# Patient Record
Sex: MALE | Race: WHITE | NOT HISPANIC OR LATINO | Employment: UNEMPLOYED | URBAN - METROPOLITAN AREA
[De-identification: names, ages, dates, MRNs, and addresses within clinical notes are randomized per-mention and may not be internally consistent; named-entity substitution may affect disease eponyms.]

---

## 2017-06-09 ENCOUNTER — OFFICE VISIT (OUTPATIENT)
Dept: BEHAVIORAL HEALTH | Facility: PHYSICIAN GROUP | Age: 22
End: 2017-06-09
Payer: COMMERCIAL

## 2017-06-09 DIAGNOSIS — F40.10 SOCIAL ANXIETY DISORDER: ICD-10-CM

## 2017-06-09 DIAGNOSIS — F34.1 DYSTHYMIC DISORDER: ICD-10-CM

## 2017-06-09 PROCEDURE — 90791 PSYCH DIAGNOSTIC EVALUATION: CPT | Performed by: PSYCHOLOGIST

## 2017-06-09 NOTE — MR AVS SNAPSHOT
Joshua Xiong   2017 2:00 PM   Appointment   MRN: [id number]    Department:  [facility]   Dept Phone:  [phone]    Description:  Male : 1995   Provider:  Brooklyn Kaye P.H.D.           Allergies as of 2017     Not on File      Vital Signs     Smoking Status                   Never Smoker            Basic Information     Date Of Birth Sex Race Ethnicity Preferred Language    1995 Male White Non- English      Your appointments     2017  2:00 PM   Initial Psychiatric Eval with Senthil Grigsby M.D.   [facility] [address] [place] [address] ([facility])    [address] [facility] [address] [facility] [address]  [address]  [place] [address]   [phone]            2017  2:00 PM   Individual Therapy with Brooklyn Kaye P.H.D.   [facility] [place] [place]    [address]  [address]  [place] [id number] [phone]            Aug 04, 2017  2:00 PM   Individual Therapy with Brooklyn Kaye P.H.D.   [facility] [place] [place]    [address]  [address]  [place] [id number] [phone]            Aug 18, 2017  2:00 PM   Individual Therapy with Brooklyn Kaye P.H.D.   [facility] [place] [place]    [address]  [address]  [place] [id number] [phone]            Aug 30, 2017  2:00 PM   Individual Therapy with Brooklyn Kaye P.H.D.   [facility] [place] [place]    [address]  [address]  [place] [address] [phone]              Health Maintenance     Patient has no pending health maintenance at this time      Current Immunizations     No immunizations on file.      Below and/or attached are the medications your provider expects you to take. Review all of your home medications and newly ordered medications with your provider and/or pharmacist. Follow medication instructions as directed by your provider and/or pharmacist. Please keep your medication list with you and share with your provider. Update the information when medications are discontinued, doses are  changed, or new medications (including over-the-counter products) are added; and carry medication information at all times in the event of emergency situations     Allergies:  No Known Allergies          Medications  Valid as of: June 09, 2017 -  2:42 PM    Generic Name Brand Name Tablet Size Instructions for use    Hydrocodone-Acetaminophen (Tab) NORCO 5-325 MG Take 1-2 Tabs by mouth every 6 hours as needed.        .                 Medicines prescribed today were sent to:     None      Medication refill instructions:       If your prescription bottle indicates you have medication refills left, it is not necessary to call your provider’s office. Please contact your pharmacy and they will refill your medication.    If your prescription bottle indicates you do not have any refills left, you may request refills at any time through one of the following ways: The online UZwan system (except Urgent Care), by calling your provider’s office, or by asking your pharmacy to contact your provider’s office with a refill request. Medication refills are processed only during regular business hours and may not be available until the next business day. Your provider may request additional information or to have a follow-up visit with you prior to refilling your medication.   *Please Note: Medication refills are assigned a new Rx number when refilled electronically. Your pharmacy may indicate that no refills were authorized even though a new prescription for the same medication is available at the pharmacy. Please request the medicine by name with the pharmacy before contacting your provider for a refill.           UZwan Access Code: [email or web address]  Expires: 7/9/2017  2:42 PM    UZwan  A secure, online tool to manage your health information     [facility] [place]’s UZwan® is a secure, online tool that connects you to your personalized health information from the privacy of your home -- day or night - making it very easy for  you to manage your healthcare. Once the activation process is completed, you can even access your medical information using the GlucoSentient winston, which is available for free in the Apple Winston store or Google Play store.     GlucoSentient provides the following levels of access (as shown below):   My Chart Features   [facility] Doctor [facility]  Specialists [facility]  Urgent  Care Non-[facility]  Primary Care  Doctor   Email your healthcare team securely and privately 24/7 X X X    Manage appointments: schedule your next appointment; view details of past/upcoming appointments X      Request prescription refills. X      View recent personal medical records, including lab and immunizations X X X X   View health record, including health history, allergies, medications X X X X   Read reports about your outpatient visits, procedures, consult and ER notes X X X X   See your discharge summary, which is a recap of your hospital and/or ER visit that includes your diagnosis, lab results, and care plan. X X       How to register for GlucoSentient:  1. Go to  [email or web address].  2. Click on the Sign Up Now box, which takes you to the New Member Sign Up page. You will need to provide the following information:  a. Enter your GlucoSentient Access Code exactly as it appears at the top of this page. (You will not need to use this code after you’ve completed the sign-up process. If you do not sign up before the expiration date, you must request a new code.)   b. Enter your date of birth.   c. Enter your home email address.   d. Click Submit, and follow the next screen’s instructions.  3. Create a GlucoSentient ID. This will be your GlucoSentient login ID and cannot be changed, so think of one that is secure and easy to remember.  4. Create a GlucoSentient password. You can change your password at any time.  5. Enter your Password Reset Question and Answer. This can be used at a later time if you forget your password.   6. Enter your e-mail address. This allows you to  receive e-mail notifications when new information is available in Vizimax.  7. Click Sign Up. You can now view your health information.    For assistance activating your Vizimax account, call [phone]

## 2017-06-13 ENCOUNTER — APPOINTMENT (OUTPATIENT)
Dept: OTHER | Facility: IMAGING CENTER | Age: 22
End: 2017-06-13

## 2017-06-13 PROCEDURE — 97530 THERAPEUTIC ACTIVITIES: CPT | Performed by: FAMILY MEDICINE

## 2017-06-14 PROBLEM — F40.10 SOCIAL ANXIETY DISORDER: Status: ACTIVE | Noted: 2017-06-14
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
## 2017-06-15 PROBLEM — F34.1 DYSTHYMIC DISORDER: Status: ACTIVE | Noted: 2017-06-15

## 2017-06-15 RX ORDER — ESCITALOPRAM OXALATE 10 MG/1
10 TABLET ORAL DAILY
COMMUNITY
End: 2017-07-11 | Stop reason: SINTOL

## 2017-06-26 ENCOUNTER — APPOINTMENT (OUTPATIENT)
Dept: OTHER | Facility: IMAGING CENTER | Age: 22
End: 2017-06-26

## 2017-06-26 PROCEDURE — 97530 THERAPEUTIC ACTIVITIES: CPT | Performed by: FAMILY MEDICINE

## 2017-06-29 ENCOUNTER — APPOINTMENT (OUTPATIENT)
Dept: OTHER | Facility: IMAGING CENTER | Age: 22
End: 2017-06-29

## 2017-06-29 PROCEDURE — 97530 THERAPEUTIC ACTIVITIES: CPT | Performed by: FAMILY MEDICINE

## 2017-07-06 ENCOUNTER — APPOINTMENT (OUTPATIENT)
Dept: OTHER | Facility: IMAGING CENTER | Age: 22
End: 2017-07-06

## 2017-07-06 PROCEDURE — 97530 THERAPEUTIC ACTIVITIES: CPT | Performed by: FAMILY MEDICINE

## 2017-07-10 ENCOUNTER — APPOINTMENT (OUTPATIENT)
Dept: OTHER | Facility: IMAGING CENTER | Age: 22
End: 2017-07-10

## 2017-07-10 PROCEDURE — 97530 THERAPEUTIC ACTIVITIES: CPT | Performed by: FAMILY MEDICINE

## 2017-07-11 ENCOUNTER — OFFICE VISIT (OUTPATIENT)
Dept: BEHAVIORAL HEALTH | Facility: PHYSICIAN GROUP | Age: 22
End: 2017-07-11
Payer: COMMERCIAL

## 2017-07-11 DIAGNOSIS — F33.9 EPISODE OF RECURRENT MAJOR DEPRESSIVE DISORDER, UNSPECIFIED DEPRESSION EPISODE SEVERITY (HCC): ICD-10-CM

## 2017-07-11 DIAGNOSIS — F40.10 SOCIAL ANXIETY DISORDER: ICD-10-CM

## 2017-07-11 PROCEDURE — 99203 OFFICE O/P NEW LOW 30 MIN: CPT | Performed by: STUDENT IN AN ORGANIZED HEALTH CARE EDUCATION/TRAINING PROGRAM

## 2017-07-11 RX ORDER — VENLAFAXINE HYDROCHLORIDE 37.5 MG/1
TABLET, EXTENDED RELEASE ORAL
Qty: 40 TAB | Refills: 0 | Status: SHIPPED | OUTPATIENT
Start: 2017-07-11 | End: 2017-07-14

## 2017-07-11 ASSESSMENT — PATIENT HEALTH QUESTIONNAIRE - PHQ9: CLINICAL INTERPRETATION OF PHQ2 SCORE: 0

## 2017-07-11 NOTE — MR AVS SNAPSHOT
Joshua Xiong   2017 2:00 PM   Office Visit   MRN: [id number]    Department:  Behavioral [facility]    Dept Phone:  [phone]    Description:  Male : 1995   Provider:  Senthil Grigsby M.D.           Allergies as of 2017     Not on File      Vital Signs     Smoking Status                   Light Tobacco Smoker           Basic Information     Date Of Birth Sex Race Ethnicity Preferred Language    1995 Male White Non- English      Your appointments     2017  2:00 PM   Follow Up Med Management with Senthil Grigsby M.D.   [facility] [address]    [address] [place] [address]  [address]  [place] [address]   [phone]            2017  2:00 PM   Individual Therapy with Brooklyn Kaye P.H.D.   [facility]    [address] [facility] [address]  [address]  [place] [id number] [phone]            Aug 04, 2017  2:00 PM   Individual Therapy with Brooklyn Kaye P.H.D.   [facility]    [address] [facility] [address]  [address]  [place] [id number] [phone]            Aug 18, 2017  2:00 PM   Individual Therapy with Brooklyn Kaye P.H.D.   [facility]    [address] [facility]  [address]  [place] [id number] [phone]            Aug 30, 2017  2:00 PM   Individual Therapy with Brooklyn Kaye P.H.D.   [facility]    [address] [facility] [address]  [address]  [place] [id number] [phone]              Problem List              ICD-10-CM Priority Class Noted - Resolved    Social anxiety disorder F40.10   2017 - Present    Dysthymic disorder F34.1   6/15/2017 - Present      Health Maintenance        Date Due Completion Dates    IMM HEP B VACCINE (1 of 3 - Primary Series) 1995 ---    IMM HEP A VACCINE (1 of 2 - Standard Series) 1998 ---    IMM HPV VACCINE (1 of 3 - Male 3 Dose Series) 2008 ---    IMM VARICELLA (CHICKENPOX) VACCINE (1 of 2 - 2 Dose Adolescent Series) 2010 ---    IMM MENINGOCOCCAL VACCINE  (MCV4) (1 of 1) 1/29/2013 ---    IMM DTaP/Tdap/Td Vaccine (1 - Tdap) 1/29/2016 ---    IMM INFLUENZA (1) 9/1/2017 ---            Current Immunizations     No immunizations on file.      Below and/or attached are the medications your provider expects you to take. Review all of your home medications and newly ordered medications with your provider and/or pharmacist. Follow medication instructions as directed by your provider and/or pharmacist. Please keep your medication list with you and share with your provider. Update the information when medications are discontinued, doses are changed, or new medications (including over-the-counter products) are added; and carry medication information at all times in the event of emergency situations     Allergies:  No Known Allergies          Medications  Valid as of: July 11, 2017 -  3:20 PM    Generic Name Brand Name Tablet Size Instructions for use    Escitalopram Oxalate (Tab) LEXAPRO 10 MG Take 10 mg by mouth every day.        Hydrocodone-Acetaminophen (Tab) NORCO 5-325 MG Take 1-2 Tabs by mouth every 6 hours as needed.        .                 Medicines prescribed today were sent to:     None      Medication refill instructions:       If your prescription bottle indicates you have medication refills left, it is not necessary to call your provider’s office. Please contact your pharmacy and they will refill your medication.    If your prescription bottle indicates you do not have any refills left, you may request refills at any time through one of the following ways: The online Shutl system (except Urgent Care), by calling your provider’s office, or by asking your pharmacy to contact your provider’s office with a refill request. Medication refills are processed only during regular business hours and may not be available until the next business day. Your provider may request additional information or to have a follow-up visit with you prior to refilling your medication.      *Please Note: Medication refills are assigned a new Rx number when refilled electronically. Your pharmacy may indicate that no refills were authorized even though a new prescription for the same medication is available at the pharmacy. Please request the medicine by name with the pharmacy before contacting your provider for a refill.           StashMetrics Access Code: [email or web address]  Expires: 8/9/2017  2:53 PM    StashMetrics  A secure, online tool to manage your health information     [employer]’s StashMetrics® is a secure, online tool that connects you to your personalized health information from the privacy of your home -- day or night - making it very easy for you to manage your healthcare. Once the activation process is completed, you can even access your medical information using the StashMetrics winston, which is available for free in the Apple Winston store or Google Play store.     StashMetrics provides the following levels of access (as shown below):   My Chart Features   [facility] Primary [facility] Doctor [facility]  Specialists [facility]  Urgent  Care Non-[facility]  Primary Care  Doctor   Email your healthcare team securely and privately 24/7 X X X    Manage appointments: schedule your next appointment; view details of past/upcoming appointments X      Request prescription refills. X      View recent personal medical records, including lab and immunizations X X X X   View health record, including health history, allergies, medications X X X X   Read reports about your outpatient visits, procedures, consult and ER notes X X X X   See your discharge summary, which is a recap of your hospital and/or ER visit that includes your diagnosis, lab results, and care plan. X X       How to register for StashMetrics:  1. Go to  [email or web address].  2. Click on the Sign Up Now box, which takes you to the New Member Sign Up page. You will need to provide the following information:  a. Enter your StashMetrics Access Code exactly as it appears at the top of this  page. (You will not need to use this code after you’ve completed the sign-up process. If you do not sign up before the expiration date, you must request a new code.)   b. Enter your date of birth.   c. Enter your home email address.   d. Click Submit, and follow the next screen’s instructions.  3. Create a BeanStockd ID. This will be your BeanStockd login ID and cannot be changed, so think of one that is secure and easy to remember.  4. Create a RxVault.int password. You can change your password at any time.  5. Enter your Password Reset Question and Answer. This can be used at a later time if you forget your password.   6. Enter your e-mail address. This allows you to receive e-mail notifications when new information is available in BeanStockd.  7. Click Sign Up. You can now view your health information.    For assistance activating your BeanStockd account, call [phone]        Quit Tobacco Information     Do you want to quit using tobacco?    Quitting tobacco decreases risks of cancer, heart and lung disease, increases life expectancy, improves sense of taste and smell, and increases spending money, among other benefits.    If you are thinking about quitting, we can help.  • [facility] Quit Tobacco Program: 808.409.3437  o Program occurs weekly for four weeks and includes pharmacist consultation on products to support quitting smoking or chewing tobacco. A provider referral is needed for pharmacist consultation.  • Tobacco Users Help Hotline: 9-800-QUIT-NOW (128-1885) or https://[place].quitlogix.org/  o Free, confidential telephone and online coaching for [place] residents. Sessions are designed on a schedule that is convenient for you. Eligible clients receive free nicotine replacement therapy.  • Nationally: www.smokefree.gov  o Information and professional assistance to support both immediate and long-term needs as you become, and remain, a non-smoker. Smokefree.gov allows you to choose the help that best fits your needs.

## 2017-07-11 NOTE — PROGRESS NOTES
PSYCHIATRIC CONSULTATION:  Reason for Evaluation:   Anxiety/depression  Supervising Physician:   Dr. Bowen        Chief Complaint: New Evaluation for Anxiety and Depression    HPI:   Seen and evaluated in outpatient setting. Says he has been suffering from Social Anxiety for as long as he can remember, describes having overwhelming worry and self-consciousness during social situations amongst new people and close friends and describes feelings of uneasiness and apprehension. Says he has difficulty opening up to outsiders and takes long time to even open up to his close friends. Says that his anxiety significantly worsened over the last two years when he went to college in [place] playing [employer] baseball and describes this time and very demanding and overwhelming and became depressed and had to transfer one year later to [facility] where he again played baseball with worsening depression. Says that prior to transferring to [place] patient lost approximately 25 pounds in 'one season.' Says over the last two years he has been suffering from worsening depression and was prescribed Lexapro roughly two months ago.Says that after starting Lexapro 10mg PO Daily had adverse reaction with excessive changes in his mood with 'very highs and lows' and described his emotions as being ' all over the place' and ended up attempting suicide by walking into traffic X1. Says he felt depressed but did not have any intentions of walking into traffic before that moment and says 'it just happened.'   Patient was weaned off of Lexapro and is not currently taking any medications. Describes feelings of numbness and emptiness. Says after his suicidal attempt about 1 month ago he started having suicidal ideation for the next two weeks until he went on vacation with his parents about two weeks ago. Says his vacation with his family improved his mood and does not have suicidal ideation at this time.Says that  being around family brought him out of his low mood and he feels back to a dysthymic baseline.     Psychiatric Review of Systems:current symptoms as reported by pt.  Depression:  Poor sleep roughly 2 hours nigthly for 1 week, Loss of interest- does not find baseball exciting anymore, denies feelings of guilt, energy level is moderate to low most days, concentration is fair to poor , appetite is fair, denies psychomotor retardation/agitation at this time, denies suicidal ideation at this time. Says that this has been persistent over the last 2 years.  Vidhi: denies symptoms of vidhi  Anxiety/Panic Attacks: as stated in HPI. No hx of panic attack  PTSD symptom: denies  Psychosis:denies       Medical Review of Systems: as reported by pt. All systems reviewed. Only those found to be + are noted below. All others are negative.   Neurological:    TBIs: denies   SZs: denies   Strokes: denies  Other medical symptoms:   Thyroid: denies   Diabetes: denies   Cardiovascular disease: denies     Psychiatric Examination: observed phenomenon:  Vitals: There were no vitals taken for this visit.  Musculoskeletal: normal gait  Appearance: casual clothing, groomed appropriately with good eye contact  Thoughts: TP: linear, organized, logical TC: denies paranoia/delusions. Denies Ah/Vh. Says he wants help with his anxiety.  Speech: monotonous  Mood: 'fine right now'  Affect: restricted  SI/HI:   denies  Attention/Alertness: alert, able to answer questions appropriatly     Memory: no abnormal deficits observed  Orientation: to person, place, time, and situation intact  Fund of Knowledge:      Insight/Judgement into symptoms:   Neurological Testing:( ie clock, cube drawing, MMSE, MOCA,etc.)    Past Psychiatric Hx:   -Hx of Lexapro 10mg Daily (started about 2 months ago) for approximately 3 weeks, then weaned off over one week after patient had erratic changes in mood along with suicidal attempt 3 weeks after starting this medication.  Suicide attempt was roughly 1 month ago.   -Recently assessed by behavioral health by Brooklyn Kaye P.H.D., will be starting weekly therapy beginning July 28th, 2017.  -hx of self injurious behaviors- burning his shoulder with hot knife. Hx of 3-4 X's within one week about 1 month ago. Says that he felt numb and empty and wanted to 'feel something.' Describes having some relief by burning himself.        Family Psychiatric Hx:  Mother with hx of one depressive episode after parents passed away, says that she had to take antidepressants and was back to 'normal' 3 months later and did not require antidepressants anymore. First cousin in 20's with hx of severe anxiety and depression    Social Hx: Recently moved back home in April-May 2017, lives with mother father, older sister 22 y/o and younger brother 20 y/o.     Drug/Alcohol/Tobacco Hx:   Drugs: marijuana daily for 2 years 1/ounce week   Alcohol: socially 1X/week. No hx of blackout   Tobacco: Chew tabacco 1-2 cans/day for 2 years, stopped about 4 months ago.     Medical Hx: denies medical hx  Medical Conditions: denies    Allergies: Lexapro- increase in suicidal ideation  Medications none at this time  Labs: no labs ordered  ECG: none on file    Cranial Imaging: none on file      ASSESSMENT:   21 y/o male with no previous previous psychiatric hx, new evaluation for depression and anxiety. Patient appears to suffer from social anxiety vs generalized anxiety disorder for as long as he can remember along with hx of dysthymia progressing severe depression over the last 2 years with hx of one previous suicidal attempt likely due to adverse effect of Lexapro. Patient meets criteria for depression at this time and would benefit from both therapy for anxiety along with anti-depressant for his depression and anxiety. Due to hx of adverse reaction to SSRI, will start Venlafaxine (SNRI) 37.5mg for 4 days and increase to 75 mg thereafter with follow-up appointment on  07/24/17 . Will closely monitor patient and requested that he call our office if he has any side effects or abnormal changes in his mood while starting this new medication. Will consider titrating this medication appropriately.       #Major Depressive Disorder  #Social Anxiety Disorder   -r/o Generalized Anxiety Disorder  -r/o dysthymia       PLAN:  -Trial Venlafaxine SR 37.5mg PO QAM X 4 days, then increase to 75mg PO QAM thereafter.   -Continue psychotherapy in outpatient setting, next appointment on 7/28/17 with Brooklyn Terrell, PHD.  -f/u on Monday 7/24/17

## 2017-07-14 RX ORDER — QUETIAPINE FUMARATE 50 MG/1
TABLET, EXTENDED RELEASE ORAL
Qty: 30 TAB | Refills: 0 | Status: SHIPPED | OUTPATIENT
Start: 2017-07-14 | End: 2017-08-01 | Stop reason: SDUPTHER

## 2017-07-14 NOTE — PROGRESS NOTES
Medication Update:    ASSESSMENT/PLAN  #Major Depressive Disorder  #Social Anxiety Disorder    -r/o Generalized Anxiety Disorder  -r/o dysthymia     Patient unable to fill Venlafaxine HCL due to finances. I have changed this medication to Seroquel XR 50mg PO QHS X 4 days, then increase to 100mg PO QHS thereafter for depression. I have called patient on 7/14/17 and notified him of the change which he agreed, he will be able to  at his local pharmacy.       Senthil Grigsby MD  PSYCHIATRY PGY 3 [facility]

## 2017-07-24 ENCOUNTER — OFFICE VISIT (OUTPATIENT)
Dept: BEHAVIORAL HEALTH | Facility: PHYSICIAN GROUP | Age: 22
End: 2017-07-24
Payer: COMMERCIAL

## 2017-07-24 DIAGNOSIS — F33.9 EPISODE OF RECURRENT MAJOR DEPRESSIVE DISORDER, UNSPECIFIED DEPRESSION EPISODE SEVERITY (HCC): ICD-10-CM

## 2017-07-24 DIAGNOSIS — F40.10 SOCIAL ANXIETY DISORDER: ICD-10-CM

## 2017-07-24 PROCEDURE — 99214 OFFICE O/P EST MOD 30 MIN: CPT | Performed by: STUDENT IN AN ORGANIZED HEALTH CARE EDUCATION/TRAINING PROGRAM

## 2017-07-24 RX ORDER — GABAPENTIN 300 MG/1
CAPSULE ORAL
Qty: 120 CAP | Refills: 1 | Status: SHIPPED | OUTPATIENT
Start: 2017-07-24 | End: 2017-08-25

## 2017-07-24 NOTE — PROGRESS NOTES
[facility]  PSYCHIATRIC FOLLOW-UP NOTE    Name: Joshua Xiong  MRN: [id number]  : 1995  Age: 22 y.o.  Date of assessment: 2017  PCP: Chuy Sellers M.D.  Persons in attendance: Patient  Total face-to-face time: 30 minutes    REASON FOR VISIT/CHIEF COMPLAINT (as stated by Patient):  Joshua Xiong is a 22 y.o., White male, attending follow-up appointment for Social Anxiety and Depression. Last seen by this provider on 17.        HISTORY OF PRESENT ILLNESS:    Seen and evaluated for f/u visit for anxiety and depression. Says that his mood has significantly improved and is not depressed anymore - mood was 2/10 previous admission and currently 7/10 today. Says that he is happier and observed to be smiling and conversing more appropriately today compared to previous visit (less lethargic), improved energy, improved concentration, does not display psychomotor retardation/lethargy, improvement in appetite. Says he is finally able to sleep but admits that he has difficulty with grogginess since starting Seroquel. Says he will experiment by taking Seroquel slightly early then 9PM in the evening to see if this would make a difference which is appropriate. Otherwise still complains of anxiety in the social environment and feeling on edge all the time which I have addressed with medication changes on this admission (below).  Denies feeling suicidal or homicidal and denies Ah/Vh at this time.    PSYCHOSOCIAL CHANGES SINCE PREVIOUS CONTACT:  Improved mood, more productive in art and other social avenues    RESPONSE TO TREATMENT:  Improved mood, says he is happier and feels almost back to normal    MEDICATION SIDE EFFECTS:  Lethargy in the morning that can last for upto 3 hours after waking up. Says he takes his medication around 9PM in the evening with full 8 hour sleep. Side effect of 'weird dreams' but says it is manageable and does not want to alter Seroquel dose as of yet.     REVIEW OF  SYSTEMS:        Constitutional negative   Eyes negative   Ears/Nose/Mouth/Throat negative   Cardiovascular negative   Respiratory negative   Gastrointestinal negative   Genitourinary negative   Muscular negative   Integumentary negative   Neurological negative   Endocrine negative   Hematologic/Lymphatic negative       PSYCHIATRIC EXAMINATION/MENTAL STATUS  There were no vitals taken for this visit.  Participation: Active verbal participation, appropriate answers  Grooming:Casual  Orientation: Alert  Eye contact: Good  Behavior:Calm   Mood: Euthymic, 'happier'  Affect: Flexible  Thought process: Logical  Thought content:  Within normal limits  Speech: Rate within normal limits (improved since last visit)  Perception:  Within normal limits  Memory:  No gross evidence of memory deficits  Insight: Good  Judgment: Good      Current risk:    Suicide: Low   Homicide: Low   Self-harm: Low  Relapse: Low  Other:   Crisis Safety Plan reviewed?Yes  If evidence of imminent risk is present, intervention/plan: call behavioral health clinic or crisis line if feeling suicidal/homicidal or significant changes to mental status.     Medical Records/Labs/Diagnostic Tests Reviewed: 5/19/2017 LABS:  Cholesterol 221 (H),  (H) otherwise CBC, CMP, and TSH within normal limits    Medical Records/Labs/Diagnostic Tests Ordered: none at this time.      ASSESSMENT:   21 y/o male with no previous previous psychiatric hx, f/u for depression and anxiety. Patient appears to suffer from social anxiety vs generalized anxiety disorder for as long as he can remember along with hx of dysthymia progressing severe depression over the last 2 years with hx of one previous suicidal attempt likely due to adverse effect of Lexapro. Patient meets criteria for depression  and would benefit from both therapy for anxiety along with mood stabilization for his depression and anxiety. Due to hx of adverse reaction to SSRI, and unable to fill Venlafaxine due to  cost of medication, trial Seroquel on 7/14/17 with significant improvement in mood/depression since starting medication. Says that his anxiety has not improved as of yet and will trial gabapentin 1200mg/daily, also beneficial for nicotine addiction.     #Major Depressive Disorder  #Social Anxiety Disorder    -r/o Generalized Anxiety Disorder  -r/o dysthymia       PLAN:  -Continue Seroquel 100mg PO/daily for mood/depression.    -Trial Gabapentin 300mg PO BID X 3 days, then increase to 300mg TID X 3 days, then increase to 300mg QID and continue- for anxiety and nicotine cravings.   -Continue psychotherapy in outpatient setting, next appointment on 7/28/17 with Brooklyn Terrell, PHD.  -f/u 4 weeks     Senthil Grigsby M.D.

## 2017-07-28 ENCOUNTER — OFFICE VISIT (OUTPATIENT)
Dept: BEHAVIORAL HEALTH | Facility: PHYSICIAN GROUP | Age: 22
End: 2017-07-28
Payer: COMMERCIAL

## 2017-07-28 DIAGNOSIS — F31.81 BIPOLAR II DISORDER MAJOR DEPRESSIVE WITH ATYPICAL FEATURES (HCC): ICD-10-CM

## 2017-07-28 DIAGNOSIS — F40.10 SOCIAL ANXIETY DISORDER: ICD-10-CM

## 2017-07-28 PROBLEM — F39 MOOD DISORDER (HCC): Status: ACTIVE | Noted: 2017-06-15

## 2017-07-28 PROCEDURE — 90834 PSYTX W PT 45 MINUTES: CPT | Performed by: PSYCHOLOGIST

## 2017-07-28 NOTE — BH THERAPY
[facility]  Therapy Progress Note    Patient Name: Joshua Xiong  Patient MRN: [id number]  Today's Date: 7/28/2017     Type of session:Individual psychotherapy  Length of session: 45 minutes  Persons in attendance:Patient    Subjective/New Info: Pt reports feeling more stable in mood and less socially anxious. Disc the medication changes and Pt's disc with MD about possible bipolar dx. Pt said he research it on line and felt the agitation, sleep problems, and irritability was not as attributed to his high anxiety as it was hypomania. Pt reports no SI, self harming, or social avoidance. He has accepted several invitations to go to social gatherings and has enjoyed himself. 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 Pt disc his family vacation and the different level of closeness he has with mom's side of family compared to dad's side. Pt has been soothing himself by painting. Shared his paintings and disc how it serves as a pleasant distraction for him. Will consult with Dr. Grigsby regarding changing primary dx to biploar II    Objective/Observations:   Participation: Active verbal participation, Attentive and Engaged   Grooming: Casual   Cognition: Alert and Fully Oriented   Eye contact: Good   Mood: Depressed   Affect: Constricted   Thought process: Logical and Goal-directed   Speech: Rate within normal limits   Other:     Diagnoses:   1. Social anxiety disorder    2. Mood disorder (CMS-HCC)         Current risk:   SUICIDE: Low   Homicide: Not applicable   Self-harm: Not applicable   Relapse: Not applicable   Other:    Safety Plan reviewed? Not Indicated   If evidence of imminent risk is present, intervention/plan:     Therapeutic Intervention(s): Conflict clarification, Establish rapport, Stressors assessed and Supportive psychotherapy    Treatment Goal(s)/Objective(s) addressed: Tx plan:  - Utilize learned skills to manage mood more effectively  - Learn to be  more emotionally flexible/resilient in times of stress/challenges  - Eliminate SI/self harming behaviors & increase sense of hope/optimisim   - Improve comfort with social/ interpersonal situations  - Learn to ameliorate effects of anxiety on life and functioning       Progress toward Treatment Goals: Moderate improvement    Plan:  - Continue Individual therapy and Medication management    Brooklyn Kaye, Ph.D.  7/28/2017

## 2017-08-01 RX ORDER — QUETIAPINE FUMARATE 50 MG/1
TABLET, EXTENDED RELEASE ORAL
Qty: 30 TAB | Refills: 0 | Status: SHIPPED | OUTPATIENT
Start: 2017-08-01 | End: 2017-08-18 | Stop reason: SDUPTHER

## 2017-08-04 ENCOUNTER — OFFICE VISIT (OUTPATIENT)
Dept: BEHAVIORAL HEALTH | Facility: PHYSICIAN GROUP | Age: 22
End: 2017-08-04
Payer: COMMERCIAL

## 2017-08-04 DIAGNOSIS — F31.81 BIPOLAR II DISORDER MAJOR DEPRESSIVE WITH ATYPICAL FEATURES (HCC): ICD-10-CM

## 2017-08-04 DIAGNOSIS — F40.10 SOCIAL ANXIETY DISORDER: ICD-10-CM

## 2017-08-04 PROCEDURE — 90834 PSYTX W PT 45 MINUTES: CPT | Performed by: PSYCHOLOGIST

## 2017-08-04 NOTE — BH THERAPY
[facility]  Therapy Progress Note    Patient Name: Joshua Xoing  Patient MRN: [id number]  Today's Date: 8/4/2017     Type of session:Individual psychotherapy  Length of session: 45 minutes  Persons in attendance:Patient    Subjective/New Info: Pt reports he is doing well. 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 Pt conts to go out and socialize with friends. Pt talked about some of the difficulties he has with his relationship with his father. Pt says home only feels calm and safe when his dad is away for long periods of time as a . When dad is home, Pt feels like he and his siblings have to walk on egg shells around his dad's explosive behavior. Pt feels much of his anxiety and inability to trust relationships stems from his interactions with his father who is very angry, demanding, controlling, withholding and black and white in his thinking. Pt is trying to get enough money to get a car and live near campus with friends. Disc how Pt could limit his interactions with his dad should have to spend a semester at home. Pt disc how his art and painting sets him free and relaxes his mind. Parents know about the dx, but dad doesn't get it and doesn't think it's a real thing.     Objective/Observations:   Participation: Active verbal participation, Attentive and Engaged   Grooming: Casual   Cognition: Alert and Fully Oriented   Eye contact: Good   Mood: Depressed   Affect: Constricted   Thought process: Logical and Goal-directed   Speech: Rate within normal limits   Other:     Diagnoses:   1. Bipolar II disorder major depressive with atypical features (CMS-HCC)    2. Social anxiety disorder         Current risk:   SUICIDE: Low   Homicide: Not applicable   Self-harm: Not applicable   Relapse: Not applicable   Other:    Safety Plan reviewed? Not Indicated   If evidence of imminent risk is present, intervention/plan:     Therapeutic  Intervention(s): Communication skills, Conflict clarification, Establish rapport, Problem-solving, Stressors assessed and Supportive psychotherapy    Treatment Goal(s)/Objective(s) addressed: Tx plan:  - Utilize learned skills to manage mood more effectively  - Learn to be more emotionally flexible/resilient in times of stress/challenges  - Eliminate SI/self harming behaviors & increase sense of hope/optimisim   - Improve comfort with social/ interpersonal situations  - Learn to ameliorate effects of anxiety on life and functioning       Progress toward Treatment Goals: Moderate improvement    Plan:  - Continue Individual therapy and Medication management    Brooklyn Kaye, Ph.D.  8/4/2017

## 2017-08-16 RX ORDER — QUETIAPINE FUMARATE 50 MG/1
TABLET, EXTENDED RELEASE ORAL
Qty: 30 TAB | Refills: 0 | Status: CANCELLED | OUTPATIENT
Start: 2017-08-16

## 2017-08-18 ENCOUNTER — OFFICE VISIT (OUTPATIENT)
Dept: BEHAVIORAL HEALTH | Facility: PHYSICIAN GROUP | Age: 22
End: 2017-08-18
Payer: COMMERCIAL

## 2017-08-18 DIAGNOSIS — F31.81 BIPOLAR II DISORDER MAJOR DEPRESSIVE WITH ATYPICAL FEATURES (HCC): ICD-10-CM

## 2017-08-18 DIAGNOSIS — F40.10 SOCIAL ANXIETY DISORDER: ICD-10-CM

## 2017-08-18 PROCEDURE — 90834 PSYTX W PT 45 MINUTES: CPT | Performed by: PSYCHOLOGIST

## 2017-08-18 RX ORDER — QUETIAPINE FUMARATE 50 MG/1
TABLET, EXTENDED RELEASE ORAL
Qty: 60 TAB | Refills: 1 | Status: SHIPPED | OUTPATIENT
Start: 2017-08-18 | End: 2017-10-03

## 2017-08-18 RX ORDER — QUETIAPINE FUMARATE 50 MG/1
TABLET, EXTENDED RELEASE ORAL
Qty: 60 TAB | Refills: 1 | Status: SHIPPED | OUTPATIENT
Start: 2017-08-18 | End: 2017-08-18 | Stop reason: SDUPTHER

## 2017-08-18 NOTE — BH THERAPY
" [facility]  Therapy Progress Note    Patient Name: Joshua Xiong  Patient MRN: [id number]  Today's Date: 8/18/2017     Type of session:Individual psychotherapy  Length of session: 45 minutes  Persons in attendance:Patient    Subjective/New Info: Pt reports he had a distressing three days when he couldn't refill his meds - no sleep for 3 days - is back on now. Disc imprtance of staying on top of his meds and refilling before running out. Pt reports that he is excited about school and nervous about the social demands he will face. Disc what \"outsider art\" is and talked about how important his art is to him and how he has decided to not take an art class because he thinks it will spoil the authentic spontaneity in his art. Psychoeducation and practice on utilization of mindfulness meditation to improve emotional regulation, cognition, impulse control, and problem solving.Email links on mindfulness meditation to Pt.     Objective/Observations:   Participation: Active verbal participation, Attentive and Engaged   Grooming: Casual   Cognition: Alert and Fully Oriented   Eye contact: Good   Mood: Depressed   Affect: Constricted   Thought process: Logical and Goal-directed   Speech: Rate within normal limits   Other:     Diagnoses:   1. Bipolar II disorder major depressive with atypical features (CMS-HCC)    2. Social anxiety disorder         Current risk:   SUICIDE: Low   Homicide: Not applicable   Self-harm: Not applicable   Relapse: Not applicable   Other:    Safety Plan reviewed? Not Indicated   If evidence of imminent risk is present, intervention/plan:     Therapeutic Intervention(s): Communication skills, Conflict clarification, Establish rapport, Problem-solving, Stressors assessed and Supportive psychotherapy    Treatment Goal(s)/Objective(s) addressed: Tx plan:  - Utilize learned skills to manage mood more effectively  - Learn to be more emotionally flexible/resilient in times of " stress/challenges  - Eliminate SI/self harming behaviors & increase sense of hope/optimisim   - Improve comfort with social/ interpersonal situations  - Learn to ameliorate effects of anxiety on life and functioning       Progress toward Treatment Goals: Mild improvement    Plan:  - Continue Individual therapy and Medication management    Brooklyn Kaye, Ph.D.  8/18/2017

## 2017-08-25 ENCOUNTER — OFFICE VISIT (OUTPATIENT)
Dept: BEHAVIORAL HEALTH | Facility: PHYSICIAN GROUP | Age: 22
End: 2017-08-25
Payer: COMMERCIAL

## 2017-08-25 DIAGNOSIS — F40.10 SOCIAL ANXIETY DISORDER: ICD-10-CM

## 2017-08-25 DIAGNOSIS — F31.81 BIPOLAR II DISORDER MAJOR DEPRESSIVE WITH ATYPICAL FEATURES (HCC): Primary | ICD-10-CM

## 2017-08-25 PROCEDURE — 99214 OFFICE O/P EST MOD 30 MIN: CPT | Performed by: STUDENT IN AN ORGANIZED HEALTH CARE EDUCATION/TRAINING PROGRAM

## 2017-08-25 RX ORDER — LITHIUM CARBONATE 300 MG
450 TABLET ORAL 2 TIMES DAILY
Qty: 45 TAB | Refills: 1 | Status: SHIPPED | OUTPATIENT
Start: 2017-08-25 | End: 2017-09-12 | Stop reason: SINTOL

## 2017-08-25 RX ORDER — HYDROXYZINE 50 MG/1
TABLET, FILM COATED ORAL
Qty: 60 TAB | Refills: 1 | Status: SHIPPED | OUTPATIENT
Start: 2017-08-25 | End: 2017-09-12

## 2017-08-25 NOTE — MR AVS SNAPSHOT
Joshua Xiong   2017 4:00 PM   Appointment   MRN: [id number]    Department:  [facility]   Dept Phone:  [phone]    Description:  Male : 1995   Provider:  Senthil Grigsby M.D.           Allergies as of 2017     No Known Allergies      Vital Signs     Smoking Status                   Light Tobacco Smoker           Basic Information     Date Of Birth Sex Race Ethnicity Preferred Language    1995 Male White Non- English      Your appointments     Aug 30, 2017  2:00 PM   Individual Therapy with Brooklyn Kaye, Ph.D.   [facility]    [address]  [address]  [place] [address]   [phone]            Sep 12, 2017  4:30 PM   Follow Up Med Management with Senthil Grigsby M.D.   [facility]    [address] [place] [address]  [address]  [place] [address]   [phone]            Oct 18, 2017 11:00 AM   Individual Therapy with Brooklyn Kaye, Ph.D.   [facility]    [address]  [address]  [place] [address]   [phone]            2017  5:00 PM   Individual Therapy with Brooklyn Kaye, Ph.D.   [facility]    [address]  [address]  [place] [address]   [phone]            Dec 05, 2017  2:00 PM   Individual Therapy with Brooklyn Kaye, Ph.D.   [facility]    [address]  [address]  [place] [address]   [phone]            Dec 19, 2017  3:00 PM   Individual Therapy with Brooklyn Kaye, Ph.D.   [facility]    [address]  [address]  [place] [address]   [phone]              Problem List              ICD-10-CM Priority Class Noted - Resolved    Social anxiety disorder F40.10   2017 - Present    Bipolar II disorder major depressive with atypical features (CMS-HCC) F31.81   6/15/2017 - Present    Episode of recurrent major depressive disorder (CMS-HCC) F33.9   2017 - Present      Health Maintenance        Date  Due Completion Dates    IMM HEP B VACCINE (1 of 3 - Primary Series) 1995 ---    IMM HEP A VACCINE (1 of 2 - Standard Series) 1/29/1998 ---    IMM HPV VACCINE (1 of 3 - Male 3 Dose Series) 1/29/2008 ---    IMM VARICELLA (CHICKENPOX) VACCINE (1 of 2 - 2 Dose Adolescent Series) 1/29/2010 ---    IMM MENINGOCOCCAL VACCINE (MCV4) (1 of 1) 1/29/2013 ---    IMM DTaP/Tdap/Td Vaccine (1 - Tdap) 1/29/2016 ---    IMM INFLUENZA (1) 9/1/2017 ---            Current Immunizations     No immunizations on file.      Below and/or attached are the medications your provider expects you to take. Review all of your home medications and newly ordered medications with your provider and/or pharmacist. Follow medication instructions as directed by your provider and/or pharmacist. Please keep your medication list with you and share with your provider. Update the information when medications are discontinued, doses are changed, or new medications (including over-the-counter products) are added; and carry medication information at all times in the event of emergency situations     Allergies:  No Known Allergies          Medications  Valid as of: August 25, 2017 -  4:32 PM    Generic Name Brand Name Tablet Size Instructions for use    Gabapentin (Cap) NEURONTIN 300 MG 1 capsule BID X 3 days, then increase to 1 capsule three TID X 3 days, then increase to 1 capsule QID continued for total of 1200mg/daily.        Hydrocodone-Acetaminophen (Tab) NORCO 5-325 MG Take 1-2 Tabs by mouth every 6 hours as needed.        QUEtiapine Fumarate (TABLET SR 24 HR) QUEtiapine Fumarate 50 MG Seroquel ER 100mg PO QHS (50mg X 2 tabs bedtime)        .                 Medicines prescribed today were sent to:     [id number] - [place] - [address]    [address] [address]    Phone: [phone] Fax: [phone]    Open 24 Hours?: No      Medication refill instructions:       If your prescription bottle indicates you have medication  refills left, it is not necessary to call your provider’s office. Please contact your pharmacy and they will refill your medication.    If your prescription bottle indicates you do not have any refills left, you may request refills at any time through one of the following ways: The online BitPass system (except Urgent Care), by calling your provider’s office, or by asking your pharmacy to contact your provider’s office with a refill request. Medication refills are processed only during regular business hours and may not be available until the next business day. Your provider may request additional information or to have a follow-up visit with you prior to refilling your medication.   *Please Note: Medication refills are assigned a new Rx number when refilled electronically. Your pharmacy may indicate that no refills were authorized even though a new prescription for the same medication is available at the pharmacy. Please request the medicine by name with the pharmacy before contacting your provider for a refill.           BitPass Access Code: [email or web address]  Expires: 9/24/2017  4:32 PM    BitPass  A secure, online tool to manage your health information     [employer]’s BitPass® is a secure, online tool that connects you to your personalized health information from the privacy of your home -- day or night - making it very easy for you to manage your healthcare. Once the activation process is completed, you can even access your medical information using the BitPass winston, which is available for free in the Apple Winston store or Google Play store.     BitPass provides the following levels of access (as shown below):   My Chart Features   [facility]  Specialists [facility]  Urgent  Care [facility] Care  Doctor   Email your healthcare team securely and privately 24/7 X X X    Manage appointments: schedule your next appointment; view details of past/upcoming appointments X      Request  prescription refills. X      View recent personal medical records, including lab and immunizations X X X X   View health record, including health history, allergies, medications X X X X   Read reports about your outpatient visits, procedures, consult and ER notes X X X X   See your discharge summary, which is a recap of your hospital and/or ER visit that includes your diagnosis, lab results, and care plan. X X       How to register for [employer]ite:  1. Go to  [email or web address].  2. Click on the Sign Up Now box, which takes you to the New Member Sign Up page. You will need to provide the following information:  a. Enter your Autosprite Access Code exactly as it appears at the top of this page. (You will not need to use this code after you’ve completed the sign-up process. If you do not sign up before the expiration date, you must request a new code.)   b. Enter your date of birth.   c. Enter your home email address.   d. Click Submit, and follow the next screen’s instructions.  3. Create a Autosprite ID. This will be your Autosprite login ID and cannot be changed, so think of one that is secure and easy to remember.  4. Create a Autosprite password. You can change your password at any time.  5. Enter your Password Reset Question and Answer. This can be used at a later time if you forget your password.   6. Enter your e-mail address. This allows you to receive e-mail notifications when new information is available in Autosprite.  7. Click Sign Up. You can now view your health information.    For assistance activating your Autosprite account, call [phone]        Quit Tobacco Information     Do you want to quit using tobacco?    Quitting tobacco decreases risks of cancer, heart and lung disease, increases life expectancy, improves sense of taste and smell, and increases spending money, among other benefits.    If you are thinking about quitting, we can help.  • [facility] Quit Tobacco Program: [phone]  o Program occurs  weekly for four weeks and includes pharmacist consultation on products to support quitting smoking or chewing tobacco. A provider referral is needed for pharmacist consultation.  • Tobacco Users Help Hotline: 7-800QUIT-NOW (713-9964) or https://[place].quitlogix.org/  o Free, confidential telephone and online coaching for [place] residents. Sessions are designed on a schedule that is convenient for you. Eligible clients receive free nicotine replacement therapy.  • Nationally: www.smokefree.gov  o Information and professional assistance to support both immediate and long-term needs as you become, and remain, a non-smoker. Smokefree.gov allows you to choose the help that best fits your needs.

## 2017-08-25 NOTE — PROGRESS NOTES
[facility]  PSYCHIATRIC FOLLOW-UP NOTE    Name: Joshua Xiong  MRN: [id number]  : 1995  Age: 22 y.o.  Date of assessment: 2017  PCP: Chuy Sellers M.D.  Persons in attendance: Patient  Total face-to-face time: 30 minutes    REASON FOR VISIT/CHIEF COMPLAINT (as stated by Patient):  Joshua Xiong is a 22 y.o., White male, attending follow-up appointment for Social Anxiety and mood instability. Last seen by this provider on 17.     CURRENT PSYCHOTROPIC MEDS:  -seroquel 100mg PO QHS for insomnia    HISTORY OF PRESENT ILLNESS:    Patient seen and evaluated in outpatient clinic this afternoon. Says that since his last visit his sleep has been much more regulated with seroquel which has been good. Admits this appointment that at times he has increased energy and racing thoughts when he has lack of sleep and the night before impulsively drove down to [place] in the middle of the night to watch the sunrise - described in a euphoric state. Admits that he ran out of his medication for 4 days and he was sleeping minimally and was able to stay up all night and draw 4 pictures that would have taken him much long otherwise.     PSYCHOSOCIAL CHANGES SINCE PREVIOUS CONTACT:  Starting school in  1 week  Recently applied for jobs at [employer] - says he has a good change of getting a part time position.    RESPONSE TO TREATMENT:  Able to sleep, very effective    MEDICATION SIDE EFFECTS:  None reported    REVIEW OF SYSTEMS:        Constitutional negative   Eyes negative   Ears/Nose/Mouth/Throat negative   Cardiovascular negative   Respiratory negative   Gastrointestinal negative   Genitourinary negative   Muscular negative   Integumentary negative   Neurological negative   Endocrine negative   Hematologic/Lymphatic negative       PSYCHIATRIC EXAMINATION/MENTAL STATUS  There were no vitals taken for this visit.  Participation: Active verbal participation  Grooming:Good and Casual  Orientation: Alert  Eye  contact: Good  Behavior:Calm   Mood: Euthymic  Affect: constricted/restricted  Thought process: Logical  Thought content:  Within normal limits  Speech: mildly slow  Perception:  Within normal limits  Memory:  No gross evidence of memory deficits  Insight: Good  Judgment: Good      Current risk:    Suicide: low/moderate : hx of 1 previous suicidal attempt (impulsive) - appears to have a mixed bipolar episode secondary to adverse reaction from SSRI   Homicide: Low   Self-harm: Low  Relapse: Low  Crisis Safety Plan reviewed?Yes  If evidence of imminent risk is present, intervention/plan: call crisis line or go straight to emergency room if suicidal/homicidal or significant changes to mental status.     Medical Records/Labs/Diagnostic Tests Reviewed: 5/19/2017 LABS:  Cholesterol 221 (H),  (H) otherwise CBC, CMP, and TSH within normal limits    Medical Records/Labs/Diagnostic Tests Ordered: none at this time.      ASSESSMENT:   21 y/o male with no previous previous psychiatric hx, f/u for mood instability and anxiety. Patient appears to suffer from social anxiety vs generalized anxiety disorder for as long as he can remember along with hx of depression and mood instability over the last 2 years with hx of one previous suicidal attempt likely due to adverse effect of Lexapro (appears to have resulted in Mixed bipolar state) . After further review and additional historical information, patient does meet criteria for bipolar disorder and would benefit from both therapy for anxiety along with mood stabiliizer.  Started low dose Seroquel on 7/14/17 with significant improvement in mood/depression. Says that his anxiety has not improved as of yet and will trial vistaril 25-50mg PO TID PRN.     #Bipolar II disorder, with mixed features  -r/o Bipolar 1 disorder, with mixed features  #Social Anxiety Disorder    -r/o Generalized Anxiety Disorder    -Cont Seroquel 100mg PO QHS for insomnia/mood   -Trial Lithium 450mg PO BID    -Lithium blood level measures after 5 days   -Trial Hydroxyzine 25-50mg PO TID PRN for anxiety   -f/u Sept 12th (Tuesday) - <3 weeks.     Senthil Grigsby M.D.

## 2017-08-30 ENCOUNTER — OFFICE VISIT (OUTPATIENT)
Dept: BEHAVIORAL HEALTH | Facility: PHYSICIAN GROUP | Age: 22
End: 2017-08-30
Payer: COMMERCIAL

## 2017-08-30 DIAGNOSIS — F31.81 BIPOLAR II DISORDER MAJOR DEPRESSIVE WITH ATYPICAL FEATURES (HCC): ICD-10-CM

## 2017-08-30 DIAGNOSIS — F40.10 SOCIAL ANXIETY DISORDER: ICD-10-CM

## 2017-08-30 PROCEDURE — 90834 PSYTX W PT 45 MINUTES: CPT | Performed by: PSYCHOLOGIST

## 2017-08-30 NOTE — BH THERAPY
" [facility]  Therapy Progress Note    Patient Name: Joshua Xiong  Patient MRN: [id number]  Today's Date: 8/30/2017     Type of session:Individual psychotherapy  Length of session: 45 minutes  Persons in attendance:Patient    Subjective/New Info: Pt reports he is not doing well. \"I feels really weird - feel like I'm on the verge of tears - either I can't sleep at all or I crash with sleep\". Left EPIC in-box mssg for Dr. Grigsby. Disc using mindfulness to hold space for difficult feeling/thinking experiences but not get too invested in them as representative of an \"absolute truth\". Pt reports he has been watching youtube videos of people with bipolar talking about the illness - he feels like he is \"screwed either way\" . Disc the process of finding the right meds, learning to care and keep balance in his life, and coming to a place of acceptance while directing energy toward cultivating a meaningful life with bipolar. Reminded Pt that when he watches those videos, he doesn't know what else might be going on in their lives or how they are managing their lives and self care. Pt is going to utilize the coping techniques for holding space for difficult emotions and see if he adjusts to the new meds. He knows to call in to Dr. Grigsby if things get worse.    Objective/Observations:   Participation: Active verbal participation, Attentive and Engaged   Grooming: Casual   Cognition: Alert and Fully Oriented   Eye contact: Good   Mood: Depressed and Anxious   Affect: Constricted   Thought process: Logical and Goal-directed   Speech: Rate within normal limits   Other:     Diagnoses:   1. Social anxiety disorder    2. Bipolar II disorder major depressive with atypical features (CMS-HCC)         Current risk:   SUICIDE: Low   Homicide: Not applicable   Self-harm: Not applicable   Relapse: Not applicable   Other:    Safety Plan reviewed? Not Indicated   If evidence of imminent risk is present, intervention/plan: "     Therapeutic Intervention(s): Communication skills, Conflict clarification, Establish rapport, Problem-solving, Stressors assessed and Supportive psychotherapy    Treatment Goal(s)/Objective(s) addressed: Tx plan:  - Utilize learned skills to manage mood more effectively  - Learn to be more emotionally flexible/resilient in times of stress/challenges  - Eliminate SI/self harming behaviors & increase sense of hope/optimisim   - Improve comfort with social/ interpersonal situations  - Learn to ameliorate effects of anxiety on life and functioning       Progress toward Treatment Goals: Mild decline    Plan:  - Continue Individual therapy and Medication management    Brooklyn Kaye, Ph.D.  8/30/2017

## 2017-08-31 ENCOUNTER — HOSPITAL ENCOUNTER (OUTPATIENT)
Dept: LAB | Facility: MEDICAL CENTER | Age: 22
End: 2017-08-31
Attending: STUDENT IN AN ORGANIZED HEALTH CARE EDUCATION/TRAINING PROGRAM
Payer: COMMERCIAL

## 2017-08-31 LAB — LITHIUM SERPL-MCNC: 0.5 MMOL/L (ref 0.6–1.2)

## 2017-08-31 PROCEDURE — 36415 COLL VENOUS BLD VENIPUNCTURE: CPT

## 2017-08-31 PROCEDURE — 80178 ASSAY OF LITHIUM: CPT

## 2017-09-01 ENCOUNTER — TELEPHONE (OUTPATIENT)
Dept: BEHAVIORAL HEALTH | Facility: PHYSICIAN GROUP | Age: 22
End: 2017-09-01

## 2017-09-01 NOTE — TELEPHONE ENCOUNTER
"Patient came by office this afternoon to discuss his physical symptoms with me. Told me that he has been feeling very shaky and describes having 'out of box' experience of his thoughts and feelings are not in synch with each other. Describes feeling like he is viewing the world from outside of a window and tells me he is scared but admits he is not suicidal or homicidal at this time. Patient states that this all started about 1 week ago after picking up his medications from the pharmacy which he described as two medications that begin with the letter \"E.\" I therefore contacted the pharmacy and found out his pharmacy has dispensed an older medication from his previous provider that was not taken out of the pharmacy database \"escitalopram\" along with his current new medication Lithium (Eskalith) prescribed by this writer.  Due to patients hx of what appears to be a bipolar disorder, 'escitalopram' is  contraindicated and patient appears to in fact have a severe reaction from taking this medication for one week. Advised to immediately stop taking his medication and a hand-written information about patients current medications including Lithium 450mg PO BID, Hydroxyzine 25-75mg Daily PRN for anxiety, and Seroquel 50mg-100mg PO QHS was handed to patient. I will call patient back this Tuesday for f/u about his condition.   "

## 2017-09-01 NOTE — TELEPHONE ENCOUNTER
Called patient X 2 this morning, phone went straight to VoiceMail. Left voicemail for patient to return my call.

## 2017-09-08 ENCOUNTER — TELEPHONE (OUTPATIENT)
Dept: BEHAVIORAL HEALTH | Facility: PHYSICIAN GROUP | Age: 22
End: 2017-09-08

## 2017-09-08 NOTE — TELEPHONE ENCOUNTER
Spoke with patient over the phone for update on his mental status. Says that he feels better after stopping the SSRI that he was taking by mistake- says he feels less anxious/manic. Next appt on 9/12/17 with this writer, will revisit meds again at this appt. .

## 2017-09-12 ENCOUNTER — OFFICE VISIT (OUTPATIENT)
Dept: BEHAVIORAL HEALTH | Facility: PHYSICIAN GROUP | Age: 22
End: 2017-09-12
Payer: COMMERCIAL

## 2017-09-12 DIAGNOSIS — F31.81 BIPOLAR II DISORDER MAJOR DEPRESSIVE WITH ATYPICAL FEATURES (HCC): ICD-10-CM

## 2017-09-12 DIAGNOSIS — F40.10 SOCIAL ANXIETY DISORDER: ICD-10-CM

## 2017-09-12 PROCEDURE — 99214 OFFICE O/P EST MOD 30 MIN: CPT | Performed by: STUDENT IN AN ORGANIZED HEALTH CARE EDUCATION/TRAINING PROGRAM

## 2017-09-12 RX ORDER — DIVALPROEX SODIUM 500 MG/1
500 TABLET, EXTENDED RELEASE ORAL 2 TIMES DAILY
Qty: 60 TAB | Refills: 1 | Status: SHIPPED | OUTPATIENT
Start: 2017-09-12 | End: 2017-10-03

## 2017-09-12 RX ORDER — DIVALPROEX SODIUM 500 MG/1
1000 TABLET, EXTENDED RELEASE ORAL 2 TIMES DAILY
Qty: 60 TAB | Refills: 1 | Status: SHIPPED | OUTPATIENT
Start: 2017-09-12 | End: 2017-09-12 | Stop reason: SDUPTHER

## 2017-09-12 RX ORDER — DIAZEPAM 5 MG/1
5 TABLET ORAL 2 TIMES DAILY
Qty: 30 TAB | Refills: 3
Start: 2017-09-12 | End: 2018-08-20

## 2017-09-13 NOTE — PROGRESS NOTES
[facility]  PSYCHIATRIC FOLLOW-UP NOTE    Name: Joshua Xiong  MRN: [id number]  : 1995  Age: 22 y.o.  Date of assessment: 2017  PCP: Chuy Sellers M.D.  Persons in attendance: Patient  Total face-to-face time: 30 minutes    REASON FOR VISIT/CHIEF COMPLAINT (as stated by Patient):  Joshua Xiong is a 22 y.o., White male, attending follow-up appointment for Social Anxiety and mood instability. Last seen by this provider on 17.     CURRENT PSYCHOTROPIC MEDS:  -seroquel 100mg PO QHS for insomnia  -Lithium 450mg PO BID     HISTORY OF PRESENT ILLNESS:    Patient seen and evaluated in outpatient clinic this afternoon. Says that since he has started Lithium, although his mood has improved and current blood level is 0.5, he has had increased thirst and drinking almost 1 gallon of water/daily. Says that he has had increased weakness and loss of appetite as well. Discussed trial of Depakote for mood stabilization (weight based), patient is not opposed to trying new medication. Advised that if this new medication is sedating then he may be able to replace Seroquel, pt acknowledged understanding.     Also complains that his anxiety has significantly worsened to the point that he is unable to hang out with his friends or do anything outside of going to his classes and coming straight home. Complains of severe panic attacks and uncontrollable shaking in social situation, willing to trial short course of Valium and propranolol for longer term anxiety treatment.     PSYCHOSOCIAL CHANGES SINCE PREVIOUS CONTACT:  Starting school in  1 week  Recently applied for jobs at [employer] - says he has a good change of getting a part time position.    RESPONSE TO TREATMENT:  Able to sleep, very effective    ADVERSE REACTION:   - hx of of Manic like episode X 2 with trial of SSRI (severe reaction )     MEDICATION SIDE EFFECTS:  None reported    REVIEW OF SYSTEMS:        Constitutional negative   Eyes negative    Ears/Nose/Mouth/Throat negative   Cardiovascular negative   Respiratory negative   Gastrointestinal negative   Genitourinary negative   Muscular negative   Integumentary negative   Neurological negative   Endocrine negative   Hematologic/Lymphatic negative       PSYCHIATRIC EXAMINATION/MENTAL STATUS  There were no vitals taken for this visit.  Participation: Active verbal participation  Grooming:Good and Casual  Orientation: Alert  Eye contact: Good  Behavior:Calm   Mood: Euthymic  Affect: constricted/restricted, anxious/tremulous   Thought process: Logical  Thought content:  Within normal limits  Speech: mildly slow  Perception:  Within normal limits  Memory:  No gross evidence of memory deficits  Insight: Good  Judgment: Good      Current risk:    Suicide: low/moderate : hx of 1 previous suicidal attempt (impulsive) - appears to have a mixed bipolar episode secondary to adverse reaction from SSRI   Homicide: Low   Self-harm: Low  Relapse: Low  Crisis Safety Plan reviewed?Yes  If evidence of imminent risk is present, intervention/plan: call crisis line or go straight to emergency room if suicidal/homicidal or significant changes to mental status.     Medical Records/Labs/Diagnostic Tests Reviewed: 5/19/2017 LABS:  Cholesterol 221 (H),  (H) otherwise CBC, CMP, and TSH within normal limits    Medical Records/Labs/Diagnostic Tests Ordered: none at this time.      ASSESSMENT:   21 y/o male with no previous previous psychiatric hx, f/u for mood instability and anxiety. Patient appears to suffer from social anxiety vs generalized anxiety disorder for as long as he can remember along with hx of depression and mood instability over the last 2 years with hx of one previous suicidal attempt likely due to adverse effect of Lexapro (appears to have resulted in Mixed bipolar state) . After further review and additional historical information, patient does meet criteria for bipolar disorder and would benefit from both  therapy for anxiety along with mood stabiliizer.  Will d/c lithium secondary to side effects of increased thirst and weakness, cross-taper with Depakote (weight based). Short trial of Valium for patients debilitating anxiety/panic disorder.     #Bipolar II disorder, with mixed features  -r/o Bipolar 1 disorder, with mixed features  #Social Anxiety Disorder    -r/o Generalized Anxiety Disorder    -Cont Seroquel 100mg PO QHS PRN for insomnia/mood   -Deandre Lithium  From 450mg PO BID to 450mg PO QDaily X 7 days, then discontinue  -TRIAL Depakote 500mg PO BID ( advised to take BID or 1000mg PO QHS if sedation occurs). Start with 500mg PO QHS X 3 days. Weight based medication, patient currently 83kg. Suggested that if patient takes this medication in the evening and is able to sleep then he may not require seroquel for insomnia.   -TRIAL Valium 5mg PO BID for continued anxiety and panic attacks, advised to take half dose if too sedating.   -consider trial low dose of propranolol for anxiety after patients mood symptoms have stabilized.   -f/u 3 weeks    Senthil Grigsby M.D.

## 2017-09-22 ENCOUNTER — TELEPHONE (OUTPATIENT)
Dept: BEHAVIORAL HEALTH | Facility: PHYSICIAN GROUP | Age: 22
End: 2017-09-22

## 2017-09-22 NOTE — TELEPHONE ENCOUNTER
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 Otherwise Damian stated he doing well, less anxious which is an improvement since starting valium. Will continue med management and f/u on Monday.

## 2017-10-03 ENCOUNTER — OFFICE VISIT (OUTPATIENT)
Dept: BEHAVIORAL HEALTH | Facility: PHYSICIAN GROUP | Age: 22
End: 2017-10-03
Payer: COMMERCIAL

## 2017-10-03 DIAGNOSIS — F31.81 BIPOLAR II DISORDER MAJOR DEPRESSIVE WITH ATYPICAL FEATURES (HCC): ICD-10-CM

## 2017-10-03 DIAGNOSIS — F40.10 SOCIAL ANXIETY DISORDER: ICD-10-CM

## 2017-10-03 PROCEDURE — 99214 OFFICE O/P EST MOD 30 MIN: CPT | Performed by: STUDENT IN AN ORGANIZED HEALTH CARE EDUCATION/TRAINING PROGRAM

## 2017-10-03 RX ORDER — QUETIAPINE 150 MG/1
150 TABLET, FILM COATED, EXTENDED RELEASE ORAL
Qty: 30 TAB | Refills: 1 | OUTPATIENT
Start: 2017-10-03 | End: 2017-11-10 | Stop reason: SDUPTHER

## 2017-10-03 RX ORDER — QUETIAPINE FUMARATE 50 MG/1
50 TABLET, EXTENDED RELEASE ORAL
Qty: 30 TAB | Refills: 1 | OUTPATIENT
Start: 2017-10-03 | End: 2017-11-17

## 2017-10-10 ENCOUNTER — DOCUMENTATION (OUTPATIENT)
Dept: BEHAVIORAL HEALTH | Facility: PHYSICIAN GROUP | Age: 22
End: 2017-10-10

## 2017-10-18 ENCOUNTER — APPOINTMENT (OUTPATIENT)
Dept: BEHAVIORAL HEALTH | Facility: PHYSICIAN GROUP | Age: 22
End: 2017-10-18
Payer: COMMERCIAL

## 2017-10-19 ENCOUNTER — DOCUMENTATION (OUTPATIENT)
Dept: BEHAVIORAL HEALTH | Facility: PHYSICIAN GROUP | Age: 22
End: 2017-10-19

## 2017-11-07 ENCOUNTER — APPOINTMENT (OUTPATIENT)
Dept: BEHAVIORAL HEALTH | Facility: PHYSICIAN GROUP | Age: 22
End: 2017-11-07
Payer: COMMERCIAL

## 2017-11-10 ENCOUNTER — OFFICE VISIT (OUTPATIENT)
Dept: BEHAVIORAL HEALTH | Facility: PHYSICIAN GROUP | Age: 22
End: 2017-11-10
Payer: COMMERCIAL

## 2017-11-10 DIAGNOSIS — F40.10 SOCIAL ANXIETY DISORDER: ICD-10-CM

## 2017-11-10 DIAGNOSIS — F31.75 BIPOLAR 1 DISORDER, DEPRESSED, PARTIAL REMISSION (HCC): ICD-10-CM

## 2017-11-10 PROCEDURE — 99214 OFFICE O/P EST MOD 30 MIN: CPT | Performed by: STUDENT IN AN ORGANIZED HEALTH CARE EDUCATION/TRAINING PROGRAM

## 2017-11-10 RX ORDER — PROPRANOLOL HYDROCHLORIDE 10 MG/1
10 TABLET ORAL 2 TIMES DAILY
Qty: 60 TAB | Refills: 1 | Status: SHIPPED | OUTPATIENT
Start: 2017-11-10 | End: 2018-01-12 | Stop reason: SDUPTHER

## 2017-11-10 RX ORDER — QUETIAPINE 150 MG/1
150 TABLET, FILM COATED, EXTENDED RELEASE ORAL
Qty: 90 TAB | Refills: 1 | Status: SHIPPED | OUTPATIENT
Start: 2017-11-10 | End: 2018-04-13

## 2017-11-10 NOTE — PROGRESS NOTES
[facility]  PSYCHIATRIC FOLLOW-UP NOTE    Name: Joshua Xiong  MRN: [id number]  : 1995  Age: 22 y.o.  Date of assessment: 11/10/17  PCP: Chuy Sellers M.D.  Persons in attendance: Patient  Total face-to-face time: 30 minutes    REASON FOR VISIT/CHIEF COMPLAINT (as stated by Patient):  Joshua Xiong is a 22 y.o., White male, attending follow-up appointment for Social Anxiety and mood instability. Last seen by this provider on 10/03/17    CURRENT PSYCHOTROPIC MEDS:  -Valium 5mg PO BID  -Seroquel 150mg PO QHS     HISTORY OF PRESENT ILLNESS:    Seen and evaluated in outpatient clinic this afternoon. 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 Says that he feels much better in his current mental state and feels like he is back to himself/baseline. Says that he is taking Valium as needed for anxiety maybe 1-2X/week but states it is not quick acting. Discussed propranolol for anxiety, willing to trial medication today. Otherwise sleeping and eating without difficulty, will refill meds and f/u in 7 weeks.     PSYCHOSOCIAL CHANGES SINCE PREVIOUS CONTACT:  Currently working at [employer]. 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     RESPONSE TO TREATMENT:  Significant improvement with Seroquel     ADVERSE REACTION:   - hx of of Manic like episode X 2 with trial of SSRI (severe reaction)     MEDICATION SIDE EFFECTS:  As stated above    REVIEW OF SYSTEMS:        Constitutional negative   Eyes negative   Ears/Nose/Mouth/Throat negative   Cardiovascular negative   Respiratory negative   Gastrointestinal negative   Genitourinary negative   Muscular negative   Integumentary negative   Neurological negative   Endocrine  negative   Hematologic/Lymphatic negative       PSYCHIATRIC EXAMINATION/MENTAL STATUS  There were no vitals taken for this visit.  Participation: Active verbal participation  Grooming:Good and Casual  Orientation: Alert  Eye contact: Good  Behavior:Calm   Mood: low  Affect: constricted/restricted  Thought process: Logical  Thought content:  Within normal limits  Speech: mildly slow, monotonous   Perception:  Within normal limits  Memory:  No gross evidence of memory deficits  Insight:fair  Judgment: fair       Current risk:    Suicide: low/moderate : hx of 1 previous suicidal attempt (impulsive) - appears to have a mixed bipolar episode secondary to adverse reaction from SSRI   Homicide: Low   Self-harm: moderate  Relapse: Low  Crisis Safety Plan reviewed?Yes  If evidence of imminent risk is present, intervention/plan: call crisis line or go straight to emergency room if suicidal/homicidal or significant changes to mental status.     Medical Records/Labs/Diagnostic Tests Reviewed: 5/19/2017 LABS:  Cholesterol 221 (H),  (H) otherwise CBC, CMP, and TSH within normal limits    Medical Records/Labs/Diagnostic Tests Ordered: none at this time.      ASSESSMENT:   23 y/o male with no previous previous psychiatric hx, f/u for mood instability and anxiety. Patient appears to suffer from social anxiety vs generalized anxiety disorder for as long as he can remember along with hx of depression and mood instability over the last 2 years with hx of one previous suicidal attempt likely due to adverse effect of Lexapro (appears to have resulted in Mixed bipolar state) . After further review and additional historical information, patient does meet criteria for bipolar disorder and would benefit from both therapy for anxiety along with mood stabiliizer. Higher doses of Seroquel for primary treatment of bipolar depression (pt trialed and failed first line mood stabilizers Lithium and Depakote secondary to side effects/adverse  effects). Willing to trial propranolol for anxiety. Has Valium PRN for breakthrough anxiety/panic attacks.       #Bipolar I disorder, with mixed features  #Social Anxiety Disorder    -r/o Generalized Anxiety Disorder    -CONT. Quetiapine Fumarate SR 150mg PO QHS for bipolar disorder  -Trial Propranolol 10mg PO BID for anxiety   - Valium 5mg PO QDaily PRN for breakthru anxiety. Currently using 1-2X/week.   -f/u 7 weeks      Previous Psychotropic Meds:  -lithium - excessive water drinking/dehydration , causing low sodium/ increased weakness   -depakote- worsened depression according to patient.   -Lexapro- Severe manic episode within one week from taking this medication. Patient accidentally restarted this medication for a second time at a later date and again severe manic episode within one week.     Senthil Grigsby M.D.

## 2017-12-05 ENCOUNTER — OFFICE VISIT (OUTPATIENT)
Dept: BEHAVIORAL HEALTH | Facility: PHYSICIAN GROUP | Age: 22
End: 2017-12-05
Payer: COMMERCIAL

## 2017-12-05 DIAGNOSIS — F31.75 BIPOLAR 1 DISORDER, DEPRESSED, PARTIAL REMISSION (HCC): ICD-10-CM

## 2017-12-05 DIAGNOSIS — F40.10 SOCIAL ANXIETY DISORDER: ICD-10-CM

## 2017-12-05 PROCEDURE — 90834 PSYTX W PT 45 MINUTES: CPT | Performed by: PSYCHOLOGIST

## 2017-12-05 NOTE — BH THERAPY
" [facility]  Therapy Progress Note    Patient Name: Joshua Xiong  Patient MRN: [id number]  Today's Date: 12/5/2017     Type of session:Individual psychotherapy  Length of session: 45 minutes  Persons in attendance:Patient    Subjective/New Info: Pt reports that he had a couple of \"good\" weeks in nov where he felt pretty \"normal\", but then he started feeling depressed again. Complains of feeling like he's \"under a dark cloud\" and that his feelings and emotions feel dampened down. Pt hasn't been sleeping well. Reports that he takes his seroquel early, but still can't fall asleep until early morning and then can't seem to get up. Pt has decided to not drink or smoke THC and so has not been doing anything social with friends bc they all over do it drinking. Pt also doesn't know who he can talk to about this, his parents don't understand and are upset by his distress. Identified some books that might be helpful for the whole family to read to understand bipolar disorder better. Disc the need for Pt to have more meaningful things going on in his life - suggested joining a friendly softball intramural league, volunteer to help  high school baseball, get involved in an art project. Pt might feel more alive and included in life if he were doing things that felt meaningful.       Objective/Observations:   Participation: Active verbal participation, Attentive and Engaged   Grooming: Casual   Cognition: Alert and Fully Oriented   Eye contact: Good   Mood: Depressed and Anxious   Affect: Constricted   Thought process: Logical and Goal-directed   Speech: Rate within normal limits   Other:     Diagnoses:   1. Bipolar 1 disorder, depressed, partial remission (CMS-HCC)    2. Social anxiety disorder         Current risk:   SUICIDE: Low   Homicide: Not applicable   Self-harm: Not applicable   Relapse: Not applicable   Other:    Safety Plan reviewed? Not Indicated   If evidence of imminent risk is present, " intervention/plan:     Therapeutic Intervention(s): Communication skills, Conflict clarification, Problem-solving, Self-care skills, Stressors assessed and Supportive psychotherapy    Treatment Goal(s)/Objective(s) addressed: Tx plan:  - Utilize learned skills to manage mood more effectively  - Learn to be more emotionally flexible/resilient in times of stress/challenges  - Eliminate SI/self harming behaviors & increase sense of hope/optimisim   - Improve comfort with social/ interpersonal situations  - Learn to ameliorate effects of anxiety on life and functioning       Progress toward Treatment Goals: Mild decline    Plan:  - Continue Individual therapy and Medication management    Brooklyn Kaye, Ph.D.  12/5/2017

## 2017-12-19 ENCOUNTER — OFFICE VISIT (OUTPATIENT)
Dept: BEHAVIORAL HEALTH | Facility: PHYSICIAN GROUP | Age: 22
End: 2017-12-19
Payer: COMMERCIAL

## 2017-12-19 DIAGNOSIS — F40.10 SOCIAL ANXIETY DISORDER: ICD-10-CM

## 2017-12-19 DIAGNOSIS — F31.75 BIPOLAR 1 DISORDER, DEPRESSED, PARTIAL REMISSION (HCC): ICD-10-CM

## 2017-12-19 PROCEDURE — 90834 PSYTX W PT 45 MINUTES: CPT | Performed by: PSYCHOLOGIST

## 2017-12-19 NOTE — BH THERAPY
[facility]  Therapy Progress Note    Patient Name: Joshua Xiong  Patient MRN: [id number]  Today's Date: 12/19/2017     Type of session:Individual psychotherapy  Length of session: 45 minutes  Persons in attendance:Patient    Subjective/New Info: Pt reports that he is getting A's in all classes - feels a little bored with how easy school is for him. 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 Also still working his other job. Pt disc how edgy and uncomfortable he feels living at home due to dad's presence. [place] dad's chronic explosive anger at Pt and brother has made them hate their father. Mom's constant worry and fretting over him is another kind of tension that makes home feel oppressive, but can't afford to move out. Disc ways in which Pt could develop other areas of his life. Pt complains about how the meds make him feel like his ability to speak is slower than his thoughts. Makes him even more apprehensive about approaching others.       Objective/Observations:   Participation: Active verbal participation, Attentive and Engaged   Grooming: Casual   Cognition: Alert and Fully Oriented   Eye contact: Good   Mood: Depressed and Anxious   Affect: Constricted   Thought process: Logical and Goal-directed   Speech: Rate within normal limits   Other:     Diagnoses:   1. Bipolar 1 disorder, depressed, partial remission (CMS-HCC)    2. Social anxiety disorder         Current risk:   SUICIDE: Low   Homicide: Not applicable   Self-harm: Not applicable   Relapse: Not applicable   Other:    Safety Plan reviewed? Not Indicated   If evidence of imminent risk is present, intervention/plan:     Therapeutic Intervention(s): Communication skills, Conflict clarification, Problem-solving, Self-care skills, Stressors assessed and Supportive psychotherapy    Treatment Goal(s)/Objective(s) addressed: Tx  plan:  - Utilize learned skills to manage mood more effectively  - Learn to be more emotionally flexible/resilient in times of stress/challenges  - Eliminate SI/self harming behaviors & increase sense of hope/optimisim   - Improve comfort with social/ interpersonal situations  - Learn to ameliorate effects of anxiety on life and functioning       Progress toward Treatment Goals: Mild improvement    Plan:  - Continue Individual therapy and Medication management    Brooklyn Kaye, Ph.D.  12/19/2017

## 2018-01-05 ENCOUNTER — OFFICE VISIT (OUTPATIENT)
Dept: BEHAVIORAL HEALTH | Facility: PHYSICIAN GROUP | Age: 23
End: 2018-01-05
Payer: COMMERCIAL

## 2018-01-05 DIAGNOSIS — F40.10 SOCIAL ANXIETY DISORDER: ICD-10-CM

## 2018-01-05 DIAGNOSIS — F31.75 BIPOLAR 1 DISORDER, DEPRESSED, PARTIAL REMISSION (HCC): ICD-10-CM

## 2018-01-05 PROCEDURE — 99214 OFFICE O/P EST MOD 30 MIN: CPT | Performed by: STUDENT IN AN ORGANIZED HEALTH CARE EDUCATION/TRAINING PROGRAM

## 2018-01-05 RX ORDER — QUETIAPINE FUMARATE 25 MG/1
25 TABLET, FILM COATED ORAL 2 TIMES DAILY
Qty: 60 TAB | Refills: 3 | Status: SHIPPED | OUTPATIENT
Start: 2018-01-05 | End: 2018-03-16 | Stop reason: SDUPTHER

## 2018-01-05 RX ORDER — QUETIAPINE FUMARATE 25 MG/1
25 TABLET, FILM COATED ORAL 2 TIMES DAILY
Qty: 60 TAB | Refills: 3 | Status: SHIPPED | OUTPATIENT
Start: 2018-01-05 | End: 2018-01-05

## 2018-01-05 RX ORDER — QUETIAPINE FUMARATE 100 MG/1
100 TABLET, FILM COATED ORAL
Qty: 30 TAB | Refills: 3 | Status: SHIPPED | OUTPATIENT
Start: 2018-01-05 | End: 2018-03-16 | Stop reason: SDUPTHER

## 2018-01-05 NOTE — PROGRESS NOTES
[facility]  PSYCHIATRIC FOLLOW-UP NOTE    Name: Joshua Xiong  MRN: [id number]  : 1995  Age: 22 y.o.  Date of assessment: 18  PCP: Chuy Sellers M.D.  Persons in attendance: Patient  Total face-to-face time: 30 minutes    REASON FOR VISIT/CHIEF COMPLAINT (as stated by Patient):  Joshua Xiong is a 22 y.o., White male, attending follow-up appointment for Social Anxiety and mood instability. Last seen by this provider on 11/10/17    CURRENT PSYCHOTROPIC MEDS:    -Seroquel 150mg PO QHS   -propranolol 10mg po bid -for anxiety  -Valium 5mg PO QDaily PRN for breakthru anxiety. Has not used in over 2 weeks.        HISTORY OF PRESENT ILLNESS:    Says that since his last appointment he has been doing well with his moods, had one episode of manic/hypomanic like symptoms that lasted upwards of 1 day with impulsivity and euphoric like mentation during lana when he was with family but otherwise did not have any significant depressive episodes. Says he feels extremely groggy when he wakes up in the morning after taking seroquel in the evening and says he feels significantly 'slower.' However, patient does state that he feels relatively 'stable' on this medication and was wondering if there are alternative medications or any changes we can make (addressed in plan). Otherwise says that he has started physical therapy assistant work  with plans to attend PT school thereafter. Admits he is more isolative at times -discussed having a healthy balance with friends, family, work, and school- says he will work on this.    PSYCHOSOCIAL CHANGES SINCE PREVIOUS CONTACT:  Currently working as a PT assistant-going well    RESPONSE TO TREATMENT:  Significant improvement with Seroquel     ADVERSE REACTION:   -lethargic in the morning    MEDICATION SIDE EFFECTS:  As stated above    REVIEW OF SYSTEMS:        Constitutional negative   Eyes negative   Ears/Nose/Mouth/Throat negative   Cardiovascular negative    Respiratory negative   Gastrointestinal negative   Genitourinary negative   Muscular negative   Integumentary negative   Neurological negative   Endocrine negative   Hematologic/Lymphatic negative       PSYCHIATRIC EXAMINATION/MENTAL STATUS  There were no vitals taken for this visit.  Participation: Active verbal participation  Grooming:Good and Casual  Orientation: Alert  Eye contact: Good  Behavior:Calm   Mood: good  Affect: constricted/restricted  Thought process: Logical  Thought content:  Within normal limits  Speech: mildly slow, monotonous   Perception:  Within normal limits  Memory:  No gross evidence of memory deficits  Insight:fair  Judgment: fair       Current risk:    Suicide: none currently: hx of 1 previous suicidal attempt (impulsive) - appears to have a mixed bipolar episode secondary to adverse reaction from SSRI   Homicide: Low   Self-harm: moderate  Relapse: Low  Crisis Safety Plan reviewed?Yes  If evidence of imminent risk is present, intervention/plan: call crisis line or go straight to emergency room if suicidal/homicidal or significant changes to mental status.     Medical Records/Labs/Diagnostic Tests Reviewed: 5/19/2017 LABS:  Cholesterol 221 (H),  (H) otherwise CBC, CMP, and TSH within normal limits    Medical Records/Labs/Diagnostic Tests Ordered: none at this time.      ASSESSMENT:   23 y/o male with no previous previous psychiatric hx, f/u for mood instability and anxiety. Patient appears to suffer from social anxiety vs generalized anxiety disorder for as long as he can remember along with hx of depression and mood instability over the last 2 years with hx of one previous suicidal attempt likely due to adverse effect of Lexapro (appears to have resulted in Mixed bipolar state with questionable psychosis) . After further review and additional historical information, patient does meet criteria for bipolar disorder and would benefit from both therapy for anxiety along with mood  stabiliizer. Higher doses of Seroquel for primary treatment of bipolar depression (pt trialed and failed first line mood stabilizers Lithium and Depakote secondary to side effects/adverse effects). Will continue propranolol for anxiety. Has Valium PRN for breakthrough anxiety/panic attacks. Next appointment consider transition from Seroquel to Latuda for mood stabilization due to side effect from seroquel if not improved.      #Bipolar I disorder, with mixed features- with questionable psychosis  -r/o Schizoaffective disorder vs Bipolar 1 disorder with psychotic features  #Social Anxiety Disorder    -r/o Generalized Anxiety Disorder    -CONT. Quetiapine Fumarate 150mg PO daily for bipolar disorder- new dosage is 100mg PO QHS and 25mg PO BID  -cont. Propranolol 10mg PO BID for anxiety   - Valium 5mg PO QDaily PRN for breakthru anxiety. Has not used in over 2 weeks.    -f/u 8 weeks      Previous Psychotropic Meds:  -lithium - excessive water drinking/dehydration , causing low sodium/ increased weakness   -depakote- worsened depression according to patient.   -Lexapro- Severe manic episode within one week from taking this medication. Patient accidentally restarted this medication for a second time at a later date and again severe manic episode within one week.     Senthil Grigsby M.D.

## 2018-01-09 ENCOUNTER — APPOINTMENT (OUTPATIENT)
Dept: BEHAVIORAL HEALTH | Facility: PHYSICIAN GROUP | Age: 23
End: 2018-01-09
Payer: COMMERCIAL

## 2018-01-12 RX ORDER — PROPRANOLOL HYDROCHLORIDE 10 MG/1
10 TABLET ORAL 2 TIMES DAILY
Qty: 60 TAB | Refills: 3 | Status: SHIPPED | OUTPATIENT
Start: 2018-01-12 | End: 2018-02-09 | Stop reason: SDUPTHER

## 2018-01-13 NOTE — TELEPHONE ENCOUNTER
Returned patients phone call, needs propranolol refill, completed today. Otherwise no other problems or concerns noted.

## 2018-02-09 ENCOUNTER — OFFICE VISIT (OUTPATIENT)
Dept: BEHAVIORAL HEALTH | Facility: PHYSICIAN GROUP | Age: 23
End: 2018-02-09
Payer: COMMERCIAL

## 2018-02-09 DIAGNOSIS — F40.10 SOCIAL ANXIETY DISORDER: ICD-10-CM

## 2018-02-09 DIAGNOSIS — F31.75 BIPOLAR 1 DISORDER, DEPRESSED, PARTIAL REMISSION (HCC): ICD-10-CM

## 2018-02-09 PROCEDURE — 99214 OFFICE O/P EST MOD 30 MIN: CPT | Performed by: STUDENT IN AN ORGANIZED HEALTH CARE EDUCATION/TRAINING PROGRAM

## 2018-02-09 RX ORDER — LAMOTRIGINE 25 MG/1
TABLET ORAL
Qty: 60 TAB | Refills: 1 | Status: SHIPPED | OUTPATIENT
Start: 2018-02-09 | End: 2018-02-23 | Stop reason: SDUPTHER

## 2018-02-09 RX ORDER — PROPRANOLOL HYDROCHLORIDE 10 MG/1
10 TABLET ORAL 3 TIMES DAILY
Qty: 90 TAB | Refills: 5 | Status: SHIPPED | OUTPATIENT
Start: 2018-02-09 | End: 2018-08-20 | Stop reason: SDUPTHER

## 2018-02-10 NOTE — PROGRESS NOTES
[facility]  PSYCHIATRIC FOLLOW-UP NOTE    Name: Joshua Xiong  MRN: [id number]  : 1995  Age: 22 y.o.  Date of assessment: 18  PCP: Chuy Sellers M.D.  Persons in attendance: Patient  Total face-to-face time: 30 minutes    REASON FOR VISIT/CHIEF COMPLAINT (as stated by Patient):  Joshua Xiong is a 22 y.o., White male, attending follow-up appointment for Social Anxiety and mood instability. Last seen by this provider on 18    CURRENT PSYCHOTROPIC MEDS:    -Seroquel 150mg PO QHS   -propranolol 10mg po bid -for anxiety         HISTORY OF PRESENT ILLNESS:    Says that since his last appointment he was doing relatively well under about 2 weeks ago he started having significant manic-like symptoms secondary to social stressors with school- describes being overworked/overwhelmed with workload that was required by one of his basic courses. Says that he came home and his mind was going racing and when he was speaking to his mother he was talking extremely fast and was 'all over the place.' Says that since this episode occurred about 2 weeks ago he has been having vivid hallucinations nightly while falling asleep. Says that he still feels like he has increase energy but denies wanting to hurt self or others at this time. Says that more recently this week his class was dropped so most of his stress has resolved-requested a formal letter explaining his diagnosis so his school can keep this on file incase he will need further accommodations- I agreed to write this letter today.    Discussed need for further mood stabilization with a safer long-term side effect profile- discussed trial of Lamictal which patient agreed to today. Discussed pros/cons of medication including risk for rosemarie viktoria syndrome. Discussed continuing Seroquel until Lamictal in more therapeutic dose range- at which point we will taper seroquel.     Of note, [facility] outpatient clinic will not be accepting [employer], I explained to patient very clearly that he may need to pay out of pocket after Feb 28th, 2018 if he decides to remain with our organization, I also provided patient with list of local providers that will be accepting his insurance. I have made accomodates to follow-up with patient for 2 more visits before Feb. 28th 2018 in order to manage his symptoms accordingly.         PSYCHOSOCIAL CHANGES SINCE PREVIOUS CONTACT:  Currently working as a PT assistant-going well. Significant social stressors from work 2 weeks ago - manic episode with poor sleep     RESPONSE TO TREATMENT:  Significant improvement with Seroquel     ADVERSE REACTION:   -lethargic in the morning    MEDICATION SIDE EFFECTS:  As stated above    REVIEW OF SYSTEMS:        Constitutional negative   Eyes negative   Ears/Nose/Mouth/Throat negative   Cardiovascular negative   Respiratory negative   Gastrointestinal negative   Genitourinary negative   Muscular negative   Integumentary negative   Neurological negative   Endocrine negative   Hematologic/Lymphatic negative       PSYCHIATRIC EXAMINATION/MENTAL STATUS  There were no vitals taken for this visit.  Participation: Active verbal participation  Grooming:Good and Casual  Orientation: Alert  Eye contact: Good  Behavior:Calm   Mood: elevated   Affect:mildly elevated,  less constricted, euthymic  Thought process: Logical  Thought content:  Within normal limits  Speech: mildly rapid, more hyperverbal than usual.   Perception:  Within normal limits  Memory:  No gross evidence of memory deficits  Insight:fair  Judgment: fair       Current risk:    Suicide: none currently: hx of 1 previous suicidal attempt (impulsive) - appears to have a mixed bipolar episode secondary to adverse reaction from SSRI   Homicide: Low   Self-harm: moderate  Relapse: Low  Crisis Safety Plan reviewed?Yes  If evidence of imminent risk is present, intervention/plan: call crisis line or go straight to emergency room if  suicidal/homicidal or significant changes to mental status.     Medical Records/Labs/Diagnostic Tests Reviewed: 5/19/2017 LABS:  Cholesterol 221 (H),  (H) otherwise CBC, CMP, and TSH within normal limits    Medical Records/Labs/Diagnostic Tests Ordered: none at this time.      ASSESSMENT:   23 y/o male with no previous previous psychiatric hx, f/u for mood instability and anxiety. Patient appears to suffer from social anxiety vs generalized anxiety disorder for as long as he can remember along with hx of depression and mood instability over the last 2 years with hx of one previous suicidal attempt likely due to adverse effect of Lexapro (appears to have resulted in Mixed bipolar state with questionable psychosis) . After further review and additional historical information, patient does meet criteria for bipolar disorder and would benefit from both therapy for anxiety along with mood stabiliizer. Will trial lamotrigine today, and continue seroquel until lamotrigine in more therapeutic range before slow taper.  (pt trialed and failed first line mood stabilizers Lithium and Depakote secondary to side effects/adverse effects). Will continue propranolol for anxiety. Has Valium PRN for breakthrough anxiety/panic attacks.     #Bipolar I disorder, with mixed features- with questionable psychosis  -r/o Schizoaffective disorder vs Bipolar 1 disorder with psychotic features  #Social Anxiety Disorder    -r/o Generalized Anxiety Disorder    -Trial Lamictal 25mg PO QDaily x 2 weeks, then increase to 50mg PO QDaily x 2 weeks. Will further titrate next visit.  -CONT. Quetiapine Fumarate 150mg PO daily for bipolar disorder- new dosage is 100mg PO QHS and 25mg PO BID. Plan to taper and discontinue AFTER lamotrigine in therapeutic range.   -cont. Propranolol 10mg PO BID for anxiety   - Valium 5mg PO QDaily PRN for breakthru anxiety. Has not used in over 2 weeks.    -f/u 8 weeks  ----------------      Previous Psychotropic  Meds:  -lithium - excessive water drinking/dehydration , causing low sodium/ increased weakness   -depakote- worsened depression according to patient.   -Lexapro- Severe manic episode within one week from taking this medication. Patient accidentally restarted this medication for a second time at a later date and again severe manic episode within one week.     Senthil Grigsby M.D.

## 2018-02-23 ENCOUNTER — OFFICE VISIT (OUTPATIENT)
Dept: BEHAVIORAL HEALTH | Facility: PHYSICIAN GROUP | Age: 23
End: 2018-02-23
Payer: COMMERCIAL

## 2018-02-23 ENCOUNTER — APPOINTMENT (OUTPATIENT)
Dept: BEHAVIORAL HEALTH | Facility: PHYSICIAN GROUP | Age: 23
End: 2018-02-23
Payer: COMMERCIAL

## 2018-02-23 DIAGNOSIS — F40.10 SOCIAL ANXIETY DISORDER: ICD-10-CM

## 2018-02-23 DIAGNOSIS — F31.75 BIPOLAR 1 DISORDER, DEPRESSED, PARTIAL REMISSION (HCC): ICD-10-CM

## 2018-02-23 PROCEDURE — 99214 OFFICE O/P EST MOD 30 MIN: CPT | Performed by: STUDENT IN AN ORGANIZED HEALTH CARE EDUCATION/TRAINING PROGRAM

## 2018-02-23 RX ORDER — LAMOTRIGINE 100 MG/1
100 TABLET ORAL DAILY
Qty: 30 TAB | Refills: 1 | Status: SHIPPED | OUTPATIENT
Start: 2018-03-08 | End: 2018-03-16

## 2018-02-23 NOTE — PROGRESS NOTES
[facility]  PSYCHIATRIC FOLLOW-UP NOTE    Name: Joshua Xiong  MRN: [id number]  : 1995  Age: 22 y.o.  Date of assessment: 18  PCP: Chuy Sellers M.D.  Persons in attendance: Patient  Total face-to-face time: 30 minutes    REASON FOR VISIT/CHIEF COMPLAINT (as stated by Patient):  Joshua Xiong is a 22 y.o., White male, attending follow-up appointment for Social Anxiety and mood instability. Last seen by this provider on 18    CURRENT PSYCHOTROPIC MEDS:    -lamotrigine 25mg daily x 2 weeks, increased to 50mg daily ( currently x 1 day)   -Seroquel 150mg PO QHS   -propranolol 10mg po TID -for anxiety         HISTORY OF PRESENT ILLNESS:    Says he has been doing well with lamictal, currently at 50mg daily (first dose taken today)- denies side effects. Says that he has not had any residual manic symptoms since last week, school has been going well and says mood has been stable. Patient has not found another provider as of yet so I encouraged him to do so as soon as possible. I discussed further titration of lamotrigine until it is at 100mg daily (100-200mg is therapeutic dose)  at which point I would start seroquel taper which he agrees to (outlined below). I have scheduled next appointment in 6 weeks and encouraged him to f/u if he does not find another provider by then ( due to change of insurance).        PSYCHOSOCIAL CHANGES SINCE PREVIOUS CONTACT:  Currently working as a PT assistant-going well. Significant social stressors from work 2 weeks ago - manic episode with poor sleep     RESPONSE TO TREATMENT:  Significant improvement with Seroquel     ADVERSE REACTION:   -lethargic in the morning with seroquel    MEDICATION SIDE EFFECTS:  As stated above    REVIEW OF SYSTEMS:        Constitutional negative   Eyes negative   Ears/Nose/Mouth/Throat negative   Cardiovascular negative   Respiratory negative   Gastrointestinal negative   Genitourinary negative   Muscular negative    Integumentary negative   Neurological negative   Endocrine negative   Hematologic/Lymphatic negative       PSYCHIATRIC EXAMINATION/MENTAL STATUS  There were no vitals taken for this visit.  Participation: Active verbal participation  Grooming:Good and Casual  Orientation: Alert  Eye contact: Good  Behavior:Calm   Mood: good  Affect: mood congruent, euthymic  Thought process: Logical  Thought content:  Within normal limits  Speech: mildly rapid, more hyperverbal than usual.   Perception:  Within normal limits  Memory:  No gross evidence of memory deficits  Insight:fair  Judgment: fair       Current risk:    Suicide: none currently: hx of 1 previous suicidal attempt (impulsive) - appears to have a mixed bipolar episode secondary to adverse reaction from SSRI   Homicide: Low   Self-harm: moderate  Relapse: Low  Crisis Safety Plan reviewed?Yes  If evidence of imminent risk is present, intervention/plan: call crisis line or go straight to emergency room if suicidal/homicidal or significant changes to mental status.     Medical Records/Labs/Diagnostic Tests Reviewed: 5/19/2017 LABS:  Cholesterol 221 (H),  (H) otherwise CBC, CMP, and TSH within normal limits    Medical Records/Labs/Diagnostic Tests Ordered: none at this time.      ASSESSMENT:   21 y/o male with no previous previous psychiatric hx, f/u for mood instability and anxiety. Patient appears to suffer from social anxiety vs generalized anxiety disorder for as long as he can remember along with hx of depression and mood instability over the last 2 years with hx of one previous suicidal attempt likely due to adverse effect of Lexapro (appears to have resulted in Mixed bipolar state with questionable psychosis) . After further review and additional historical information, patient does meet criteria for bipolar disorder and would benefit from both therapy for anxiety along with mood stabiliizer. Will continue lamotrigine titration today, and continue seroquel  until lamotrigine in more therapeutic range before slow taper.  (pt trialed and failed first line mood stabilizers Lithium and Depakote secondary to side effects/adverse effects). Will continue propranolol for anxiety. Has Valium PRN for breakthrough anxiety/panic attacks.     #Bipolar I disorder, with mixed features- with questionable psychosis  -r/o Schizoaffective disorder vs Bipolar 1 disorder with psychotic features  #Social Anxiety Disorder    -r/o Generalized Anxiety Disorder    -TITRATION SCHEDULE: Lamictal 50mg PO QDaily x 2 weeks, then increase to 50mg BID. May further titrate next visit.  -Taper . Quetiapine Fumarate 150mg PO daily to 100mg QHS for bipolar disorder after lamotrigine at 100mg daily for at least 2 weeks. Plan to taper and discontinue AFTER lamotrigine in therapeutic range.   -cont. Propranolol 10mg PO TID for anxiety   - Valium 5mg PO QDaily PRN for breakthru anxiety. Has not used in over 2 weeks.    -f/u 6 weeks  ----------------      Previous Psychotropic Meds:  -lithium - excessive water drinking/dehydration , causing low sodium/ increased weakness   -depakote- worsened depression according to patient.   -Lexapro- Severe manic episode within one week from taking this medication. Patient accidentally restarted this medication for a second time at a later date and again severe manic episode within one week.     Senthil Grigsby M.D.

## 2018-02-27 ENCOUNTER — APPOINTMENT (OUTPATIENT)
Dept: BEHAVIORAL HEALTH | Facility: PHYSICIAN GROUP | Age: 23
End: 2018-02-27
Payer: COMMERCIAL

## 2018-03-16 ENCOUNTER — OFFICE VISIT (OUTPATIENT)
Dept: BEHAVIORAL HEALTH | Facility: PHYSICIAN GROUP | Age: 23
End: 2018-03-16
Payer: COMMERCIAL

## 2018-03-16 DIAGNOSIS — F31.2 SEVERE MANIC BIPOLAR 1 DISORDER WITH PSYCHOTIC BEHAVIOR (HCC): ICD-10-CM

## 2018-03-16 DIAGNOSIS — F40.10 SOCIAL ANXIETY DISORDER: ICD-10-CM

## 2018-03-16 PROCEDURE — 99214 OFFICE O/P EST MOD 30 MIN: CPT | Performed by: STUDENT IN AN ORGANIZED HEALTH CARE EDUCATION/TRAINING PROGRAM

## 2018-03-16 RX ORDER — LAMOTRIGINE 200 MG/1
200 TABLET ORAL DAILY
Qty: 30 TAB | Refills: 2 | Status: SHIPPED | OUTPATIENT
Start: 2018-03-16 | End: 2018-06-06 | Stop reason: SDUPTHER

## 2018-03-16 RX ORDER — QUETIAPINE FUMARATE 100 MG/1
100 TABLET, FILM COATED ORAL
Qty: 30 TAB | Refills: 5 | Status: SHIPPED | OUTPATIENT
Start: 2018-03-16 | End: 2020-11-12

## 2018-03-16 RX ORDER — QUETIAPINE FUMARATE 25 MG/1
25 TABLET, FILM COATED ORAL 2 TIMES DAILY
Qty: 60 TAB | Refills: 5 | Status: SHIPPED | OUTPATIENT
Start: 2018-03-16 | End: 2018-04-13

## 2018-03-16 NOTE — PROGRESS NOTES
[facility]  PSYCHIATRIC FOLLOW-UP NOTE    Name: Joshua Xiong  MRN: [id number]  : 1995  Age: 22 y.o.  Date of assessment: 18  PCP: Chuy Sellers M.D.  Persons in attendance: Patient  Total face-to-face time: 30 minutes    REASON FOR VISIT/CHIEF COMPLAINT (as stated by Patient):  Joshua Xiong is a 22 y.o., White male, attending follow-up appointment for Social Anxiety and mood instability with psychotic features. Last seen by this provider on 18    CURRENT PSYCHOTROPIC MEDS:    -Lamotrigine 100mg PO QDaily since 18  -Seroquel 100mg PO QHS since 18  -propranolol 10mg po TID -for anxiety  -Valium 5mg PO QDaily PRN for breakthrough anxiety, last used 1 dose on 18         HISTORY OF PRESENT ILLNESS:    Says that on Monday patient felt 'amped' and was hearing bombs go off in the classroom while he was taking his final exams. Says that he immediately went to see his counselor who he said told him that that he was very worried and something was 'not right' and that patient looked 'pale.' Counselor made comments that patient was not making sense and was talking about 'rockets going off in the classroom.' Says that over the next two days patient felt very depressed as well until yesterday afternoon he started feeling much better again. Says that he feels great at this moment and is not having any manic/psychotic symptoms at this time. Denies feelings suicidal/homicidal.         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     RESPONSE TO TREATMENT:  Significant improvement with Seroquel overall     ADVERSE REACTION:   -lethargic in the morning with seroquel    MEDICATION SIDE EFFECTS:  As stated above    REVIEW OF SYSTEMS:        Constitutional negative   Eyes negative   Ears/Nose/Mouth/Throat negative    Cardiovascular negative   Respiratory negative   Gastrointestinal negative   Genitourinary negative   Muscular negative   Integumentary negative   Neurological negative   Endocrine negative   Hematologic/Lymphatic negative       PSYCHIATRIC EXAMINATION/MENTAL STATUS  There were no vitals taken for this visit.  Participation: Active verbal participation  Grooming:Good and Casual  Orientation: Alert  Eye contact: Good  Behavior:Calm   Mood: good  Affect: mood congruent, euthymic  Thought process: Logical  Thought content:  Within normal limits  Speech: RRR   Perception:  Within normal limits  Memory:  No gross evidence of memory deficits  Insight:fair  Judgment: fair       Current risk:    Suicide: none currently: hx of 1 previous suicidal attempt (impulsive) - appears to have a mixed bipolar episode secondary to adverse reaction from SSRI   Homicide: Low   Self-harm: moderate  Relapse: Low  Crisis Safety Plan reviewed?Yes  If evidence of imminent risk is present, intervention/plan: call crisis line or go straight to emergency room if suicidal/homicidal or significant changes to mental status.     Medical Records/Labs/Diagnostic Tests Reviewed: 5/19/2017 LABS:  Cholesterol 221 (H),  (H) otherwise CBC, CMP, and TSH within normal limits    Medical Records/Labs/Diagnostic Tests Ordered: none at this time.      ASSESSMENT:   21 y/o male with no previous previous psychiatric hx, f/u for mood instability with likely psychosis and anxiety. Patient appears to suffer from social anxiety vs generalized anxiety disorder for as long as he can remember along with hx of depression and mood instability over the last 2 years with hx of one previous suicidal attempt likely due to adverse effect of Lexapro (appears to have resulted in Mixed bipolar state with questionable psychosis) . After further review and additional historical information, patient does meet criteria for bipolar disorder and would benefit from both therapy  for anxiety along with mood stabiliizer. Will continue lamotrigine titration today, and continue seroquel until lamotrigine in more therapeutic range before slow taper.  (pt trialed and failed first line mood stabilizers Lithium and Depakote secondary to side effects/adverse effects). Will continue propranolol for anxiety. Has Valium PRN for breakthrough anxiety/panic attacks.     #Bipolar I disorder, mixed with psychotic features   -r/o Schizoaffective disorder   #Social Anxiety Disorder    -r/o Generalized Anxiety Disorder    -TITRATION SCHEDULE: Lamictal 100mg PO QDaily, then increase to 200mg PO QDaily starting 03/23/18  -Cont. Quetiapine Fumarate 100mg QHS for bipolar disorder. If symptoms of depression or agitation/hallucinations return then increase to 150mg PO QDaily (explained to patient.) Plan to slowly taper (25mg per week) and possibly discontinue AFTER lamotrigine between 200-300mg range.   -cont. Propranolol 10mg PO TID for anxiety   - Valium 5mg PO QDaily PRN for breakthru anxiety. Used one dose on  03/12/18  --f/u appt on 03/30/18 (2 weeks)  ----------------      Previous Psychotropic Meds:  -lithium - excessive water drinking/dehydration , causing low sodium/ increased weakness   -depakote- worsened depression according to patient.   -Lexapro- Severe manic episode within one week from taking this medication. Patient accidentally restarted this medication for a second time at a later date and again severe manic episode within one week.       Senthil Grigsby M.D.

## 2018-03-30 ENCOUNTER — APPOINTMENT (OUTPATIENT)
Dept: BEHAVIORAL HEALTH | Facility: PHYSICIAN GROUP | Age: 23
End: 2018-03-30
Payer: COMMERCIAL

## 2018-04-13 ENCOUNTER — OFFICE VISIT (OUTPATIENT)
Dept: BEHAVIORAL HEALTH | Facility: PHYSICIAN GROUP | Age: 23
End: 2018-04-13
Payer: COMMERCIAL

## 2018-04-13 DIAGNOSIS — F40.10 SOCIAL ANXIETY DISORDER: ICD-10-CM

## 2018-04-13 DIAGNOSIS — F31.2 SEVERE MANIC BIPOLAR 1 DISORDER WITH PSYCHOTIC BEHAVIOR (HCC): ICD-10-CM

## 2018-04-13 PROCEDURE — 99214 OFFICE O/P EST MOD 30 MIN: CPT | Performed by: STUDENT IN AN ORGANIZED HEALTH CARE EDUCATION/TRAINING PROGRAM

## 2018-04-13 RX ORDER — ARIPIPRAZOLE 5 MG/1
TABLET ORAL
Qty: 30 TAB | Refills: 1 | Status: SHIPPED | OUTPATIENT
Start: 2018-04-13 | End: 2018-04-27 | Stop reason: SDUPTHER

## 2018-04-13 NOTE — PROGRESS NOTES
[facility]  PSYCHIATRIC FOLLOW-UP NOTE    Name: Joshua Xiong  MRN: [id number]  : 1995  Age: 22 y.o.  Date of assessment: 18  PCP: Chuy Sellers M.D.  Persons in attendance: Patient  Total face-to-face time: 30 minutes    REASON FOR VISIT/CHIEF COMPLAINT (as stated by Patient):  Joshua Xiong is a 22 y.o., White male, attending follow-up appointment for Social Anxiety and mood instability with psychotic features. Last seen by this provider on 18    CURRENT PSYCHOTROPIC MEDS:    -Lamotrigine 200mg PO QDaily for 3 weeks  -Seroquel 100mg PO QHS since 18  -propranolol 10mg po TID PRN-for anxiety. Currently taking upwards of 2x/day.   -Valium 5mg PO QDaily PRN for breakthrough anxiety, last used 1 dose on 18         HISTORY OF PRESENT ILLNESS:    Says that the last two weeks his mood has been very good, denies having manic like symptoms or depression. Says his anxiety appears to be better controlled with lamictal which he is also happy about. Also concern is continued hypnagogic hallucinations that happened twice over the last week. Says that this occurs after he takes his seroquel and is in bed about to fall asleep. Says he heard a witch say 'i'm going to get you' on one occasion and on a second occasion he heard a door bell ring.     Says that other then these two hallucination events he has remained very stable overall. Discussed trial of Abilify for longer term treatment for his bipolar/psychosis due to significant somnolence in the morning from quetiapine. Patient willing to trial today. Discussed adverse effects including akathisia and requested patient stop this new medication if unable to tolerate.       PSYCHOSOCIAL CHANGES SINCE PREVIOUS CONTACT:  Currently working as a PT assistant-going well.     RESPONSE TO TREATMENT:  Significant improvement with Seroquel overall     ADVERSE REACTION:   -lethargic in the morning with seroquel    MEDICATION SIDE  EFFECTS:  As stated above    REVIEW OF SYSTEMS:        Constitutional negative   Eyes negative   Ears/Nose/Mouth/Throat negative   Cardiovascular negative   Respiratory negative   Gastrointestinal negative   Genitourinary negative   Muscular negative   Integumentary negative   Neurological negative   Endocrine negative   Hematologic/Lymphatic negative       PSYCHIATRIC EXAMINATION/MENTAL STATUS  There were no vitals taken for this visit.  Participation: Active verbal participation  Grooming:Good and Casual  Orientation: Alert  Eye contact: Good  Behavior:Calm   Mood: good  Affect: mood congruent, euthymic  Thought process: Logical  Thought content:  Within normal limits  Speech: RRR   Perception:  Within normal limits  Memory:  No gross evidence of memory deficits  Insight:fair  Judgment: fair       Current risk:    Suicide: none currently: hx of 1 previous suicidal attempt (impulsive) - appears to have a mixed bipolar episode secondary to adverse reaction from SSRI   Homicide: Low   Self-harm: moderate  Relapse: Low  Crisis Safety Plan reviewed?Yes  If evidence of imminent risk is present, intervention/plan: call crisis line or go straight to emergency room if suicidal/homicidal or significant changes to mental status.     Medical Records/Labs/Diagnostic Tests Reviewed: 5/19/2017 LABS:  Cholesterol 221 (H),  (H) otherwise CBC, CMP, and TSH within normal limits    Medical Records/Labs/Diagnostic Tests Ordered: none at this time.      ASSESSMENT:   21 y/o male with hx for mood instability with likely psychosis and anxiety. Patient appears to suffer from social anxiety vs generalized anxiety disorder for as long as he can remember along with hx of depression and mood instability over the last 2 years with hx of one previous suicidal attempt likely due to adverse effect of Lexapro (appears to have resulted in Mixed bipolar state with questionable psychosis) . After further review and additional historical  information, patient does meet criteria for bipolar disorder with psychotic features (vs schizoaffective disorder) and would benefit from mood stabilizer and antipsychotic. Patient appears to be doing really well on lamictal for mood and anxiety  (pt trialed and failed first line mood stabilizers Lithium and Depakote secondary to side effects/adverse effects). Although seroquel helps manage patients psychosis, it causes severe somnolence in the morning after waking up. At this time will trial abilify with eventual plan to taper seroquel. Will continue propranolol for anxiety. Has Valium PRN for breakthrough anxiety/panic attacks.     #Bipolar I disorder, mixed with psychotic features   -r/o Schizoaffective disorder   #Social Anxiety Disorder    -r/o Generalized Anxiety Disorder    -Cont. Lamictal 200mg PO Qdaily  -Cont. Quetiapine Fumarate 100mg QHS for bipolar disorder. If symptoms of depression or agitation/hallucinations return then increase to 150mg PO QDaily (explained to patient.) Plan to slowly taper (25mg per week) and possibly discontinue AFTER psychosis is better managed   -Trial . Abilify 2.5mg PO Qdaily x 7 days, then increase to 5mg PO Qdaily thereafter.  -cont. Propranolol 10mg PO TID PRN for anxiety   - Valium 5mg PO QDaily PRN for breakthru anxiety. Used one dose on  03/12/18  --f/u appt 3 weeks  ----------------      Previous Psychotropic Meds:  -lithium - excessive water drinking/dehydration , causing low sodium/ increased weakness   -depakote- worsened depression according to patient.   -Lexapro- Severe manic episode within one week from taking this medication. Patient accidentally restarted this medication for a second time at a later date and again severe manic episode within one week.       Senthil Grigsby M.D.

## 2018-04-27 ENCOUNTER — OFFICE VISIT (OUTPATIENT)
Dept: BEHAVIORAL HEALTH | Facility: PHYSICIAN GROUP | Age: 23
End: 2018-04-27
Payer: COMMERCIAL

## 2018-04-27 DIAGNOSIS — F40.10 SOCIAL ANXIETY DISORDER: ICD-10-CM

## 2018-04-27 DIAGNOSIS — F31.2 SEVERE MANIC BIPOLAR 1 DISORDER WITH PSYCHOTIC BEHAVIOR (HCC): ICD-10-CM

## 2018-04-27 PROCEDURE — 99214 OFFICE O/P EST MOD 30 MIN: CPT | Performed by: STUDENT IN AN ORGANIZED HEALTH CARE EDUCATION/TRAINING PROGRAM

## 2018-04-27 RX ORDER — ARIPIPRAZOLE 10 MG/1
10 TABLET ORAL DAILY
Qty: 30 TAB | Refills: 2 | Status: SHIPPED | OUTPATIENT
Start: 2018-04-27 | End: 2018-05-25

## 2018-04-27 NOTE — PROGRESS NOTES
[facility]  PSYCHIATRIC FOLLOW-UP NOTE    Name: Joshua Xiong  MRN: [id number]  : 1995  Age: 22 y.o.  Date of assessment: 18  PCP: Chuy Sellers M.D.  Persons in attendance: Patient  Total face-to-face time: 30 minutes    REASON FOR VISIT/CHIEF COMPLAINT (as stated by Patient):  Joshua Xiong is a 22 y.o., White male, attending follow-up appointment for Social Anxiety and mood instability with psychotic features. Last seen by this provider on 18    CURRENT PSYCHOTROPIC MEDS:    -Lamotrigine 200mg PO QDaily for 3 weeks  -Seroquel 100mg PO QHS since 18  -propranolol 10mg po TID PRN-for anxiety. Currently taking upwards of 2x/day.   -Abilify 5mg PO Qdaily  -Valium 5mg PO QDaily PRN for breakthrough anxiety, last used 1 dose on 18         HISTORY OF PRESENT ILLNESS:    Says that he continues to do really well, says most of his days are really good and his mood currently is great. Admits that he still has hallucinations about 1x/week usually when falling asleep ( hypnagogic) but overall is very pleased with abilify. Admits having 1 day of restlessness when first starting abilify but overall says he feels stable on this medication, will continue titration today. Says his anxiety and fear of social anxiety has significantly decreased with abilify as well. Says he is doing well in school, got two A's on his last chemistry tests, going on dates, and has a new set of friends.     Agrees with plan to start seroquel taper AFTER abilify is at 10mg daily      PSYCHOSOCIAL CHANGES SINCE PREVIOUS CONTACT:  Currently working as a PT assistant-going well.     RESPONSE TO TREATMENT:  Significant improvement with Seroquel overall     ADVERSE REACTION:   -lethargic in the morning with seroquel    MEDICATION SIDE EFFECTS:  As stated above    REVIEW OF SYSTEMS:        Constitutional negative   Eyes negative   Ears/Nose/Mouth/Throat negative   Cardiovascular negative   Respiratory  negative   Gastrointestinal negative   Genitourinary negative   Muscular negative   Integumentary negative   Neurological negative   Endocrine negative   Hematologic/Lymphatic negative       PSYCHIATRIC EXAMINATION/MENTAL STATUS  There were no vitals taken for this visit.  Participation: Active verbal participation  Grooming:Good and Casual  Orientation: Alert  Eye contact: Good  Behavior:Calm   Mood: good  Affect: mood congruent, euthymic  Thought process: Logical  Thought content:  Within normal limits  Speech: RRR   Perception:  Within normal limits  Memory:  No gross evidence of memory deficits  Insight:fair  Judgment: fair       Current risk:    Suicide: none currently: hx of 1 previous suicidal attempt (impulsive) - appears to have a mixed bipolar episode secondary to adverse reaction from SSRI   Homicide: Low   Self-harm: moderate  Relapse: Low  Crisis Safety Plan reviewed?Yes  If evidence of imminent risk is present, intervention/plan: call crisis line or go straight to emergency room if suicidal/homicidal or significant changes to mental status.     Medical Records/Labs/Diagnostic Tests Reviewed: 5/19/2017 LABS:  Cholesterol 221 (H),  (H) otherwise CBC, CMP, and TSH within normal limits    Medical Records/Labs/Diagnostic Tests Ordered: none at this time.      ASSESSMENT:   21 y/o male with hx for mood instability with likely psychosis and anxiety. Patient appears to suffer from social anxiety vs generalized anxiety disorder for as long as he can remember along with hx of depression and mood instability over the last 2 years with hx of one previous suicidal attempt likely due to adverse effect of Lexapro (appears to have resulted in Mixed bipolar state with questionable psychosis) . After further review and additional historical information, patient does meet criteria for bipolar disorder with psychotic features (vs schizoaffective disorder) and would benefit from mood stabilizer and antipsychotic.  Patient appears to be doing really well on lamictal for mood and anxiety  (pt trialed and failed first line mood stabilizers Lithium and Depakote secondary to side effects/adverse effects). Although seroquel helps manage patients psychosis, it causes severe somnolence in the morning after waking up. At this time will trial abilify with eventual plan to taper seroquel. Will continue propranolol for anxiety. Has Valium PRN for breakthrough anxiety/panic attacks.     #Bipolar I disorder, mixed with psychotic features   -r/o Schizoaffective disorder   #Social Anxiety Disorder    -r/o Generalized Anxiety Disorder    -Cont. Lamictal 200mg PO Qdaily for bipolar disorder   -Taper. Quetiapine Fumarate from 100mg QHS to 75mg PO QHS for bipolar disorder after patient is stable on abilify 10mg daily for 10 days. If symptoms of depression or agitation/hallucinations return then increase to 100mg PO QDaily (explained to patient.) Plan to slowly taper (25mg per week) and possibly discontinue AFTER psychosis is better managed   -Titrate. Abilify from 5mg to 7.5mg daily x 5-10 days, then increase to 10mg PO Qdaily-cont.  -Cont.  Propranolol 10mg PO TID PRN for anxiety   - Valium 5mg PO QDaily PRN for breakthru anxiety. Has not used since last visit  --f/u appt 4 weeks  ----------------      Previous Psychotropic Meds:  -lithium - excessive water drinking/dehydration , causing low sodium/ increased weakness   -depakote- worsened depression according to patient.   -Lexapro- Severe manic episode within one week from taking this medication. Patient accidentally restarted this medication for a second time at a later date and again severe manic episode within one week.       Senthil Grigsby M.D.

## 2018-05-11 ENCOUNTER — TELEPHONE (OUTPATIENT)
Dept: BEHAVIORAL HEALTH | Facility: PHYSICIAN GROUP | Age: 23
End: 2018-05-11

## 2018-05-11 NOTE — TELEPHONE ENCOUNTER
Returned patients phone call. Experienced severe akathesia with abilify and says it made him feel like 'I was watching the world from the outside' describing feeling disconnected. Says at this time he likes the combo of lamictal and low dose seroquel. Will continue this regimen until f/u appt with this provider on 6/22/18, pt agreed with this plan.

## 2018-05-25 ENCOUNTER — OFFICE VISIT (OUTPATIENT)
Dept: OTHER | Facility: IMAGING CENTER | Age: 23
End: 2018-05-25
Payer: COMMERCIAL

## 2018-05-25 VITALS
TEMPERATURE: 98.7 F | RESPIRATION RATE: 16 BRPM | DIASTOLIC BLOOD PRESSURE: 72 MMHG | HEART RATE: 86 BPM | WEIGHT: 167.77 LBS | SYSTOLIC BLOOD PRESSURE: 104 MMHG | BODY MASS INDEX: 22.24 KG/M2 | OXYGEN SATURATION: 94 % | HEIGHT: 73 IN

## 2018-05-25 DIAGNOSIS — F40.10 SOCIAL ANXIETY DISORDER: ICD-10-CM

## 2018-05-25 DIAGNOSIS — F31.75 BIPOLAR 1 DISORDER, DEPRESSED, PARTIAL REMISSION (HCC): ICD-10-CM

## 2018-05-25 DIAGNOSIS — F31.2 SEVERE MANIC BIPOLAR 1 DISORDER WITH PSYCHOTIC BEHAVIOR (HCC): ICD-10-CM

## 2018-05-25 PROCEDURE — 99205 OFFICE O/P NEW HI 60 MIN: CPT | Performed by: FAMILY MEDICINE

## 2018-05-25 NOTE — PROGRESS NOTES
Chief Complaint   Patient presents with   • Establish Care   • Manic Behavior     diagnosed July 2017 and has taken different medications since. his current treatment seems to be working but he notices feelings of grogginess in the morning    • Nutrition Counseling     decreased appetite      Joshua Xiong is a 23 y.o. male who usually sees Chuy Sellers M.D. presents today to establish care at [facility] and to discuss his mood swinging up and down.    HPI:  Severe manic bipolar 1 disorder with psychotic behavior (CMS-[facility]) ([facility])  Patient's 22-year-old  male with a history of mood instability is with conscious of multiple psychosis episode and had failed previous knee with mood stabilizers including lithium and Depakote.  Patient eventually settle on Seroquel 100 mg seems to be controlling his psychosis episodes well even though the patient still described intermittently having some mood swings that can be disruptive to his daily activities.  Patient was intended to start taper of Seroquel from 100 mg to 75 mg about 4 weeks ago with addition of Abilify.  There was some akinesia that happened with Abilify therefore Abilify was DC'd and patient is reportedly still taking 100 mg Seroquel at this time.    Social anxiety disorder  Patient had chronic stenting social anxiety disorder that had required long-term use of Valium.  For last 2 year patient had one suicide attempt after start of Lexapro.  Patient had been visiting Dr. Cardenas at [place] for the last 2 weeks that had done muscle testing for food group sensitivity and had addressed chicken and egg food types.  Was able to tolerate 2 eggs.    Bipolar 1 disorder, depressed, partial remission (CMS-[facility]) ([facility])  Patient reports also has manic episodes gets most of therapeutic attention that he is constantly feeling blue on a daily basis.  Patient has been experiencing extreme sedation from his Seroquel ingestion, he also has difficulty falling asleep  and at times keeping a sleep.    Current medicines (including changes today)  Current Outpatient Prescriptions   Medication Sig Dispense Refill   • lamotrigine (LAMICTAL) 200 MG tablet Take 1 Tab by mouth every day. 30 Tab 2   • QUEtiapine (SEROQUEL) 100 MG Tab Take 1 Tab by mouth every bedtime. 30 Tab 5   • propranolol (INDERAL) 10 MG Tab Take 1 Tab by mouth 3 times a day. 90 Tab 5   • diazepam (VALIUM) 5 MG Tab Take 1 Tab by mouth 2 Times a Day. 30 Tab 3     No current facility-administered medications for this visit.      He  has a past medical history of Anxiety; Depression; and Self-injurious behavior.  He  has no past surgical history on file.  Social History   Substance Use Topics   • Smoking status: Light Tobacco Smoker   • Smokeless tobacco: Former User     Quit date: 2017      Comment: uses electronic vape   • Alcohol use Yes      Comment: a glass of wine once a week      Family History   Problem Relation Age of Onset   • Depression Mother    • Depression Cousin    • Anxiety disorder Cousin      Family Status   Relation Status   • Mother Alive   • Father Alive   • Sister Alive   • Brother Alive   • Maternal Grandfather    • Cousin Alive     Social History     Social History Narrative   • No narrative on file     ROS  Constitutional: Negative for fever, chills, positive for malaise/fatigue.   HENT: Negative for ear pain, nosebleeds, congestion, sore throat and neck pain.    Eyes: Negative for blurred vision.   Respiratory: Negative for cough, sputum production, shortness of breath and wheezing.    Cardiovascular: Negative for chest pain, palpitations, orthopnea and leg swelling.   Gastrointestinal: Negative for heartburn, nausea, vomiting and abdominal pain.  Positive for epigastric fullness, negative for food stuck in the throat feeling.  Genitourinary: Negative for dysuria, urgency and frequency.   Musculoskeletal: Negative for myalgias, back pain and joint pain.   Skin: Negative for rash and  "itching.   Neurological: Negative for dizziness, tingling, tremors, sensory change, focal weakness and headaches.   Endo/Heme/Allergies: Does not bruise/bleed easily.   Psychiatric/Behavioral: Negative for depression, anxiety, or memory loss.     All other systems reviewed and are negative except as in HPI.    Labs reviewed with patient:  Lab Results   Component Value Date/Time    WBC 8.1 12/16/2014 05:20 PM    RBC 5.13 12/16/2014 05:20 PM    HEMOGLOBIN 16.8 12/16/2014 05:20 PM    HEMATOCRIT 48.2 12/16/2014 05:20 PM    MCV 94.0 12/16/2014 05:20 PM    MCH 32.7 12/16/2014 05:20 PM    MCHC 34.9 12/16/2014 05:20 PM    MPV 11.1 12/16/2014 05:20 PM    NEUTSPOLYS 59.6 12/16/2014 05:20 PM    LYMPHOCYTES 30.8 12/16/2014 05:20 PM    MONOCYTES 5.2 12/16/2014 05:20 PM    EOSINOPHILS 3.4 12/16/2014 05:20 PM    BASOPHILS 0.9 12/16/2014 05:20 PM      Lab Results   Component Value Date/Time    SODIUM 136 12/16/2014 05:20 PM    POTASSIUM 3.8 12/16/2014 05:20 PM    CHLORIDE 106 12/16/2014 05:20 PM    CO2 22 12/16/2014 05:20 PM    GLUCOSE 134 (H) 12/16/2014 05:20 PM    BUN 16 12/16/2014 05:20 PM    CREATININE 1.01 12/16/2014 05:20 PM    ALKPHOSPHAT 82 12/16/2014 05:20 PM    ASTSGOT 19 12/16/2014 05:20 PM    ALTSGPT 12 12/16/2014 05:20 PM    TBILIRUBIN 1.0 12/16/2014 05:20 PM        Objective:   Blood pressure 104/72, pulse 86, temperature 37.1 °C (98.7 °F), resp. rate 16, height 1.854 m (6' 1\"), weight 76.1 kg (167 lb 12.3 oz), SpO2 94 %. Body mass index is 22.13 kg/m².  Physical Exam:  Constitutional: Alert, no distress.  Skin: Warm, dry, good turgor, no rashes in visible areas.  Eye: Equal, round and reactive, conjunctiva clear, lids normal.  ENMT: Lips without lesions, good dentition, oropharynx clear.  Neck: Trachea midline, no masses, no thyromegaly.  Respiratory: Unlabored respiratory effort, lungs clear to auscultation, no wheezes, no ronchi.  Cardiovascular: Normal S1, S2, no murmur, no edema.  Abdomen: Soft, non-tender, no " masses, no hepatosplenomegaly.  Psych: Alert and oriented x3, normal affect and mood.    Assessment and Plan:   The following treatment plan was discussed  1. Bipolar 1 disorder, depressed, partial remission (HCC)      Advised to use prime tea daily for the next 8 weeks, also considering utilizing Chinese herb upon his private acupuncture session in the next 2 weeks.  We will hold off additional supplementation of Saffron and can discuss of it further.   2. Social anxiety disorder      Continue Inderal 10 mg 3 times a day as well as utilizing various relaxation techniques that was thought during his previous sessions with his therapists.  Continue with his follow up with behavior health and Dr. Cardenas.  Adding probiotics daily with species of Bifidobacterium longum and L. Helveticus.  Additional species such as L. Rhamnosus and L. Reuteri.  Anecdotal evidence points to L. Plantarum along or with Bacillus Coagulans, Bifidobacterium and Saccaromyces cerevsiae to be equally or more soothing effects. L. Casei is found in one research to have modest effect.  additional support for meditation can be added after 4 weeks.  Discussed in detail of the content of meditation and aspect of the anxiety that specifically can address.  Patient verbalized understanding.   3. Severe manic bipolar 1 disorder with psychotic behavior (HCC)      Continue Seroquel 100 mg for at least next 3 weeks, adding acupuncture sessions every 2 weeks for the next 6 weeks.  Secondary to his reaction to Abilify, patient is advised to use prime tea daily instead.  Increase dietary fiber intake in 6 weeks.  We will also need to add Food as Medicine introductory lectures within the next 2 weeks.     Records requested.  Followup: Return in about 2 months (around 7/25/2018).    Spent 65 minutes in face-to-tace patient contact in which greater than 50% of the visit was spent in counseling and coordination of care including Bipolar medication management  options.  Concerns and potential risks related to Abilify.  Answered patient's question about excessive sedation related to Seroquel.  Discussed the nature of bipolar 1 disease and his gut health.  Discussing in depth the importance of primary prevention of future manic episode.  Patient is also educated about lifestyle modifications which may improve his bipolar 1.  We also reviewed PMH, family history, and each of his chronic diagnoses in regards to current management, specialty physicians, symptom control, and future planning.  Please refer to assessment and plan/discussion/recommendations for additional details.          I have worked with consultants from the vendor as well as technical experts from [employer] to optimize the interface. I have made every reasonable attempt to correct obvious errors, but I expect that there are errors of grammar and possibly content that I did not discover before finalizing the note.

## 2018-05-25 NOTE — ASSESSMENT & PLAN NOTE
Patient had chronic stenting social anxiety disorder that had required long-term use of Valium.  For last 2 year patient had one suicide attempt after start of Lexapro.  Patient had been visiting Dr. Cardenas at [place] for the last 2 weeks that had done muscle testing for food group sensitivity and had addressed chicken and egg food types.  Was able to tolerate 2 eggs.

## 2018-05-25 NOTE — ASSESSMENT & PLAN NOTE
Patient's 22-year-old  male with a history of mood instability is with conscious of multiple psychosis episode and had failed previous knee with mood stabilizers including lithium and Depakote.  Patient eventually settle on Seroquel 100 mg seems to be controlling his psychosis episodes well even though the patient still described intermittently having some mood swings that can be disruptive to his daily activities.  Patient was intended to start taper of Seroquel from 100 mg to 75 mg about 4 weeks ago with addition of Abilify.  There was some akinesia that happened with Abilify therefore Abilify was DC'd and patient is reportedly still taking 100 mg Seroquel at this time.

## 2018-05-25 NOTE — ASSESSMENT & PLAN NOTE
Patient reports also has manic episodes gets most of therapeutic attention that he is constantly feeling blue on a daily basis.  Patient has been experiencing extreme sedation from his Seroquel ingestion, he also has difficulty falling asleep and at times keeping a sleep.

## 2018-06-08 RX ORDER — LAMOTRIGINE 200 MG/1
200 TABLET ORAL DAILY
Qty: 30 TAB | Refills: 2 | Status: SHIPPED | OUTPATIENT
Start: 2018-06-08 | End: 2018-09-12 | Stop reason: SDUPTHER

## 2018-06-22 ENCOUNTER — APPOINTMENT (OUTPATIENT)
Dept: BEHAVIORAL HEALTH | Facility: PHYSICIAN GROUP | Age: 23
End: 2018-06-22
Payer: COMMERCIAL

## 2018-08-20 ENCOUNTER — OFFICE VISIT (OUTPATIENT)
Dept: BEHAVIORAL HEALTH | Facility: PHYSICIAN GROUP | Age: 23
End: 2018-08-20
Payer: COMMERCIAL

## 2018-08-20 VITALS
HEIGHT: 73 IN | SYSTOLIC BLOOD PRESSURE: 122 MMHG | HEART RATE: 82 BPM | WEIGHT: 169 LBS | DIASTOLIC BLOOD PRESSURE: 80 MMHG | BODY MASS INDEX: 22.4 KG/M2

## 2018-08-20 DIAGNOSIS — F19.91 HISTORY OF DRUG USE: ICD-10-CM

## 2018-08-20 DIAGNOSIS — Z79.899 ENCOUNTER FOR LONG-TERM (CURRENT) USE OF MEDICATIONS: ICD-10-CM

## 2018-08-20 DIAGNOSIS — F25.0 SCHIZOAFFECTIVE DISORDER, BIPOLAR TYPE (HCC): ICD-10-CM

## 2018-08-20 DIAGNOSIS — F40.10 SOCIAL ANXIETY DISORDER: ICD-10-CM

## 2018-08-20 PROBLEM — F31.75 BIPOLAR 1 DISORDER, DEPRESSED, PARTIAL REMISSION (HCC): Status: RESOLVED | Noted: 2017-11-10 | Resolved: 2018-08-20

## 2018-08-20 PROBLEM — F31.2 SEVERE MANIC BIPOLAR 1 DISORDER WITH PSYCHOTIC BEHAVIOR (HCC): Status: RESOLVED | Noted: 2018-03-16 | Resolved: 2018-08-20

## 2018-08-20 PROCEDURE — 99214 OFFICE O/P EST MOD 30 MIN: CPT | Performed by: PSYCHIATRY & NEUROLOGY

## 2018-08-20 RX ORDER — PROPRANOLOL HYDROCHLORIDE 10 MG/1
10 TABLET ORAL 2 TIMES DAILY
Qty: 180 TAB | Refills: 0 | Status: SHIPPED | OUTPATIENT
Start: 2018-08-20 | End: 2020-11-12

## 2018-08-20 RX ORDER — DIAZEPAM 2 MG/1
1-2 TABLET ORAL
Qty: 30 TAB | Refills: 0 | Status: SHIPPED
Start: 2018-08-20 | End: 2018-09-19

## 2018-08-20 ASSESSMENT — ABNORMAL INVOLUNTARY MOVEMENT SCALE (AIMS)
JAW: NONE, NORMAL
AIMS_PATIENT_INCAPACITATION: NONE, NORMAL
LOWER_BODY_EXTREMITIES: NONE, NORMAL
AIMS_PATIENT_AWARENESS: NO AWARENESS
LIPS_PARIETAL: NONE, NORMAL
AIMS_SEVERITY: 0
TONGUE: NONE, NORMAL
PATIENT_WEARS_DENTURES: NO
FACIAL_EXPRESSION_MUSCLES: NONE, NORMAL
CURRENT_PROBLEMS_TEETH_DENTURES: NO
NECK_SHOULDER_HIPS: NONE, NORMAL
UPPER_BODY_EXTREMITIES: NONE, NORMAL

## 2018-08-20 NOTE — PROGRESS NOTES
PSYCHIATRY FOLLOW-UP NOTE      Chief Complaint   Patient presents with   • Other     transfer of care, follow up on anxiety and mood         History Of Present Illness:  Joshua Xiong is a 23 y.o. old male with bipolar mood disorder, social anxiety disorder comes in today for follow up, was last seen for an by Dr. Grigsby 4 months ago.  He is switching his care and this is his first visit with me.  Reports doing good in regards to his mood and anxiety since his last visit here.  He has been compliant with his Lamictal which she feels has really helped with his mood swings.  He reports onset of his mood and anxiety symptoms over a year ago when he was having frequent mood swings, reckless and impulsive behaviors including overspending money excessive alcohol and illicit drug use.  He was in [employer] but was unable to continue his studies there and transferred to [employer].  He is a full-time student and is starting school next week.  He denies any recent hypomanic or manic symptoms.  He endorses nighttime psychotic symptoms couple of times a week.  He has both auditory and visual hallucinations infrequently just before he goes to bed.  He has never had a sleep study done to rule out any underlying sleep disorder.  He often hears a witch talking or bomb exploding in his pillow during his transition to sleep.  He has woken up a few times with hallucination but denies any daytime auditory or visual hallucinations.  He has had these hallucinations even though he is not having any mood symptoms.  He does take Seroquel which she feels helps him with sleep and his psychosis that he is taking only 50 mg as he has significant daytime sedation with 100 mg dose.  He also has anxiety more so in social situations.  He does not like large groups of people and feels comfortable within his close friends.  He avoids going to parties as it causes significant anxiety.  He has been taking propranolol  twice a day which helps some of his anxiety and uses Valium maybe once a month and he is unable to control her anxiety.  He has noticed that even if he takes half a tablet of Valium he feels sedated and slowed down for the next couple of days.  He denies anxiety and a day-to-day basis.  He denies any current psychosocial stressors.  He has good support from his parents, sibling and girlfriend.  Denies any current problems with his appetite.  Denies any current depressive symptoms.  Denies having thoughts of wanting to hurt himself or others.    Social History:   He is a full-time student at [employer] and is working on his degree in community health science.  He would eventually like to go to [employer] school.  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  He lives in [place] with his parents, older sister and younger brother who are in college as well.  He is currently in a relationship and his girlfriend moved to [place] from [place] recently.  He denies any current legal problems.    Substance Use:  Alcohol - Drinks glass of wine wine or a beer once a week  Nicotine - Denies  Illicit drugs - Denies current or recent use.  He reports using ced, cannabis , ecstasy, cocaine and meth when he was in [place] but denies any illicit drug use for over a year.    Past Medication Trials:  Lithium (s/e - tremor), Depakote (s/e - tremor), Lexapro (s/e - hypomanic symptoms), Abilify (s/e - akathisia), Hydroxyzine    Medications:  Current Outpatient Prescriptions   Medication Sig Dispense Refill   • propranolol (INDERAL) 10 MG Tab Take 1 Tab by mouth 2 times a day. 180 Tab 0   • diazePAM (VALIUM) 2 MG Tab Take 0.5-1 Tabs by mouth 1 time daily as needed for Anxiety for up to 30 days. 30 Tab 0   • lamotrigine (LAMICTAL) 200 MG tablet Take 1 Tab by mouth every day. 30 Tab 2   • QUEtiapine (SEROQUEL) 100 MG Tab Take 1 Tab by mouth every bedtime. 30 Tab 5     No current facility-administered medications  "for this visit.        Review Of Systems:    Constitutional - Positive for fatigue  Respiratory - Negative for shortness of breath, cough  CVS - Negative for chest pain, palpitations  GI - Negative for nausea, vomiting, abdominal pain, diarrhea, constipation  Musculoskeletal - Negative for back pain  Neurological - Negative for headaches  Psychiatric - Positive for hallucinations, anxiety    Physical Examination:  Vital signs: /80   Pulse 82   Ht 1.854 m (6' 1\")   Wt 76.7 kg (169 lb)   BMI 22.30 kg/m²     Musculoskeletal: Normal gait. No abnormal movements.     Mental Status Evaluation:   General: Young white male, dressed in casual attire, good grooming and hygiene, in no apparent distress, calm and cooperative, good eye contact, no psychomotor agitation or retardation  Orientation: Alert and oriented to person, place and time  Recent and remote memory: Grossly intact  Attention span and concentration: Grossly intact  Speech: Spontaneous, normal rate, rhythm and tone  Thought Process: Linear, logical and goal directed  Thought Content: Denies suicidal or homicidal ideations, intent or plan  Perception: Positive for occasional auditory or visual hallucinations. No delusions noted  Associations: Intact  Language: Appropriate  Fund of knowledge and vocabulary: Grossly adequate  Mood: \"am okay\"  Affect: Euthymic, mood congruent  Insight: Good  Judgment: Good    Depression screening:  Depression Screen (PHQ-2/PHQ-9) 7/11/2017   PHQ-2 Total Score 0     Interpretation of PHQ-9 Total Score   Score Severity   1-4 No Depression   5-9 Mild Depression   10-14 Moderate Depression   15-19 Moderately Severe Depression   20-27 Severe Depression    Medical Records/Labs/Diagnostic Tests Reviewed:  [place]  records - appropriate refills on Valium, no abuse suspected      Impression:  1.  Schizoaffective disorder, bipolar type - new problem  2.  Social anxiety disorder - new problem    Plan:  1.  Continue Lamictal 200 mg daily " for mood stabilization  2.  Continue Seroquel 50 mg at bedtime for psychotic symptoms and insomnia   - AIMS 0 today   - Metabolic monitoring: A1c and lipid profile ordered  3.  Decrease Valium to 1-2 mg daily as needed for anxiety. #30 tabs with no refill prescription faxed to his pharmacy.  4.  UDS ordered today to rule out any current illicit drug use    Return to clinic in 2 months or sooner if symptoms worsen    The proposed treatment plan was discussed with the patient who was provided the opportunity to ask questions and make suggestions regarding alternative treatment. Patient verbalized understanding and expressed agreement with the plan.     Sridevi Dougherty M.D.  08/20/18    This note was created using voice recognition software (Dragon). The accuracy of the dictation is limited by the abilities of the software. I have reviewed the note prior to signing, however some errors in grammar and context are still possible. If you have any questions related to this note please do not hesitate to contact our office.

## 2018-08-23 ENCOUNTER — HOSPITAL ENCOUNTER (OUTPATIENT)
Dept: LAB | Facility: MEDICAL CENTER | Age: 23
End: 2018-08-23
Attending: PSYCHIATRY & NEUROLOGY
Payer: COMMERCIAL

## 2018-08-23 DIAGNOSIS — Z79.899 ENCOUNTER FOR LONG-TERM (CURRENT) USE OF MEDICATIONS: ICD-10-CM

## 2018-08-23 DIAGNOSIS — F19.91 HISTORY OF DRUG USE: ICD-10-CM

## 2018-08-23 LAB
AMPHET UR QL SCN: NEGATIVE
BARBITURATES UR QL SCN: NEGATIVE
BENZODIAZ UR QL SCN: NEGATIVE
BZE UR QL SCN: NEGATIVE
CANNABINOIDS UR QL SCN: NEGATIVE
CHOLEST SERPL-MCNC: 162 MG/DL (ref 100–199)
EST. AVERAGE GLUCOSE BLD GHB EST-MCNC: 105 MG/DL
HBA1C MFR BLD: 5.3 % (ref 0–5.6)
HDLC SERPL-MCNC: 47 MG/DL
LDLC SERPL CALC-MCNC: 107 MG/DL
METHADONE UR QL SCN: NEGATIVE
OPIATES UR QL SCN: NEGATIVE
OXYCODONE UR QL SCN: NEGATIVE
PCP UR QL SCN: NEGATIVE
PROPOXYPH UR QL SCN: NEGATIVE
TRIGL SERPL-MCNC: 39 MG/DL (ref 0–149)

## 2018-08-23 PROCEDURE — 80307 DRUG TEST PRSMV CHEM ANLYZR: CPT

## 2018-08-23 PROCEDURE — 83036 HEMOGLOBIN GLYCOSYLATED A1C: CPT

## 2018-08-23 PROCEDURE — 36415 COLL VENOUS BLD VENIPUNCTURE: CPT

## 2018-08-23 PROCEDURE — 80061 LIPID PANEL: CPT

## 2018-09-12 RX ORDER — LAMOTRIGINE 200 MG/1
200 TABLET ORAL DAILY
Qty: 90 TAB | Refills: 0 | Status: SHIPPED | OUTPATIENT
Start: 2018-09-12 | End: 2018-12-09 | Stop reason: SDUPTHER

## 2018-10-18 ENCOUNTER — APPOINTMENT (OUTPATIENT)
Dept: BEHAVIORAL HEALTH | Facility: CLINIC | Age: 23
End: 2018-10-18
Payer: COMMERCIAL

## 2019-04-02 ENCOUNTER — NON-PROVIDER VISIT (OUTPATIENT)
Dept: OTHER | Facility: IMAGING CENTER | Age: 24
End: 2019-04-02
Payer: COMMERCIAL

## 2019-04-02 DIAGNOSIS — Z23 NEED FOR TETANUS, DIPHTHERIA, AND ACELLULAR PERTUSSIS (TDAP) VACCINE: ICD-10-CM

## 2019-04-02 PROCEDURE — 90471 IMMUNIZATION ADMIN: CPT | Performed by: FAMILY MEDICINE

## 2019-04-02 PROCEDURE — 90715 TDAP VACCINE 7 YRS/> IM: CPT | Performed by: FAMILY MEDICINE

## 2019-04-03 NOTE — PROGRESS NOTES
"Joshua Xiong is a 24 y.o. male here for a non-provider visit for:   TDAP    Reason for immunization: Overdue/Provider Recommended  Immunization records indicate need for vaccine: Yes, confirmed with Epic  Minimum interval has been met for this vaccine: Yes  ABN completed: Yes    Order and dose verified by: RAMIRO Shin MA  VIS Dated  2/24/15 was given to patient: Yes  All IAC Questionnaire questions were answered \"No.\"    Patient tolerated injection and no adverse effects were observed or reported: Yes    Pt scheduled for next dose in series: Not Indicated    "

## 2020-11-01 ENCOUNTER — TELEPHONE (OUTPATIENT)
Dept: SCHEDULING | Facility: IMAGING CENTER | Age: 25
End: 2020-11-01

## 2020-11-11 SDOH — ECONOMIC STABILITY: TRANSPORTATION INSECURITY
IN THE PAST 12 MONTHS, HAS THE LACK OF TRANSPORTATION KEPT YOU FROM MEDICAL APPOINTMENTS OR FROM GETTING MEDICATIONS?: NO

## 2020-11-11 SDOH — ECONOMIC STABILITY: HOUSING INSECURITY: IN THE LAST 12 MONTHS, HOW MANY PLACES HAVE YOU LIVED?: 1

## 2020-11-11 SDOH — ECONOMIC STABILITY: HOUSING INSECURITY
IN THE LAST 12 MONTHS, WAS THERE A TIME WHEN YOU DID NOT HAVE A STEADY PLACE TO SLEEP OR SLEPT IN A SHELTER (INCLUDING NOW)?: NO

## 2020-11-11 SDOH — ECONOMIC STABILITY: INCOME INSECURITY: IN THE LAST 12 MONTHS, WAS THERE A TIME WHEN YOU WERE NOT ABLE TO PAY THE MORTGAGE OR RENT ON TIME?: NO

## 2020-11-11 SDOH — HEALTH STABILITY: MENTAL HEALTH
STRESS IS WHEN SOMEONE FEELS TENSE, NERVOUS, ANXIOUS, OR CAN'T SLEEP AT NIGHT BECAUSE THEIR MIND IS TROUBLED. HOW STRESSED ARE YOU?: ONLY A LITTLE

## 2020-11-11 SDOH — HEALTH STABILITY: PHYSICAL HEALTH: ON AVERAGE, HOW MANY MINUTES DO YOU ENGAGE IN EXERCISE AT THIS LEVEL?: 90 MINUTES

## 2020-11-11 SDOH — HEALTH STABILITY: PHYSICAL HEALTH: ON AVERAGE, HOW MANY DAYS PER WEEK DO YOU ENGAGE IN MODERATE TO STRENUOUS EXERCISE (LIKE A BRISK WALK)?: 7 DAYS

## 2020-11-11 SDOH — ECONOMIC STABILITY: TRANSPORTATION INSECURITY
IN THE PAST 12 MONTHS, HAS LACK OF RELIABLE TRANSPORTATION KEPT YOU FROM MEDICAL APPOINTMENTS, MEETINGS, WORK OR FROM GETTING THINGS NEEDED FOR DAILY LIVING?: NO

## 2020-11-11 ASSESSMENT — SOCIAL DETERMINANTS OF HEALTH (SDOH)
DO YOU BELONG TO ANY CLUBS OR ORGANIZATIONS SUCH AS CHURCH GROUPS UNIONS, FRATERNAL OR ATHLETIC GROUPS, OR SCHOOL GROUPS?: NO
WITHIN THE PAST 12 MONTHS, YOU WORRIED THAT YOUR FOOD WOULD RUN OUT BEFORE YOU GOT THE MONEY TO BUY MORE: NEVER TRUE
HOW MANY DRINKS CONTAINING ALCOHOL DO YOU HAVE ON A TYPICAL DAY WHEN YOU ARE DRINKING: 5 OR 6
HOW HARD IS IT FOR YOU TO PAY FOR THE VERY BASICS LIKE FOOD, HOUSING, MEDICAL CARE, AND HEATING?: NOT VERY HARD
ARE YOU MARRIED, WIDOWED, DIVORCED, SEPARATED, NEVER MARRIED, OR LIVING WITH A PARTNER?: NEVER MARRIED
HOW OFTEN DO YOU ATTENT MEETINGS OF THE CLUB OR ORGANIZATION YOU BELONG TO?: NEVER
IN A TYPICAL WEEK, HOW MANY TIMES DO YOU TALK ON THE PHONE WITH FAMILY, FRIENDS, OR NEIGHBORS?: ONCE A WEEK
WITHIN THE PAST 12 MONTHS, THE FOOD YOU BOUGHT JUST DIDN'T LAST AND YOU DIDN'T HAVE MONEY TO GET MORE: NEVER TRUE
HOW OFTEN DO YOU GET TOGETHER WITH FRIENDS OR RELATIVES?: TWICE A WEEK
HOW OFTEN DO YOU ATTEND CHURCH OR RELIGIOUS SERVICES?: NEVER
HOW OFTEN DO YOU HAVE SIX OR MORE DRINKS ON ONE OCCASION: MONTHLY
HOW OFTEN DO YOU HAVE A DRINK CONTAINING ALCOHOL: 2-4 TIMES A MONTH

## 2020-11-12 ENCOUNTER — TELEMEDICINE (OUTPATIENT)
Dept: MEDICAL GROUP | Facility: LAB | Age: 25
End: 2020-11-12
Payer: COMMERCIAL

## 2020-11-12 DIAGNOSIS — R53.83 FATIGUE, UNSPECIFIED TYPE: ICD-10-CM

## 2020-11-12 DIAGNOSIS — Z11.3 SCREENING FOR STD (SEXUALLY TRANSMITTED DISEASE): ICD-10-CM

## 2020-11-12 DIAGNOSIS — F25.0 SCHIZOAFFECTIVE DISORDER, BIPOLAR TYPE (HCC): ICD-10-CM

## 2020-11-12 DIAGNOSIS — Z00.00 GENERAL MEDICAL EXAM: ICD-10-CM

## 2020-11-12 PROCEDURE — 99202 OFFICE O/P NEW SF 15 MIN: CPT | Performed by: PHYSICIAN ASSISTANT

## 2020-11-12 NOTE — PROGRESS NOTES
This evaluation was conducted via Zoom using secure and encrypted videoconferencing technology. The patient was in a private location in the state [facility].    The patient's identity was confirmed and verbal consent was obtained for this virtual visit.    Subjective:     CC: Establish care   Joshua Xiong is a 25 y.o. male presenting to establish care and to discuss the evaluation and follow up on mood and requesting labs.     Schizoaffective disorder, bipolar type (HCC)  New to me; chronic  No longer using medication at this time.   Reports that he is doing much better off the medications.   Seeing psychologist once per week.   No SI/HI    Working on mindfulness practices which has been helping his mood.           ROS  Constitutional: Negative for fever, chills and malaise/fatigue.   HENT: Negative for congestion.    Eyes: Negative for pain.   Respiratory: Negative for cough and shortness of breath.    Cardiovascular: Negative for leg swelling.   Gastrointestinal: Negative for nausea, vomiting, abdominal pain and diarrhea.   Genitourinary: Negative for dysuria and hematuria.   Skin: Negative for rash.   Neurological: Negative for dizziness, focal weakness and headaches.   Endo/Heme/Allergies: Does not bruise/bleed easily.   Psychiatric/Behavioral: Negative for depression.  The patient is not nervous/anxious.      Allergies   Allergen Reactions   • Lexapro      Hx of Severe Manic Episode while taking Lexapro. Patient has hx of Bipolar Disorder and has severe reaction to SSRI/SNRI medication.        Current medicines (including changes today)  No current outpatient medications on file.     No current facility-administered medications for this visit.        He  has a past medical history of Anxiety, Depression, and Self-injurious behavior.  He  has no past surgical history on file.      Family History   Problem Relation Age of Onset   • Depression Mother    • Cancer Mother    • No Known Problems Father    •  No Known Problems Sister    • No Known Problems Brother    • Depression Cousin    • Anxiety disorder Cousin      Family Status   Relation Name Status   • Mo  Alive   • Fa  Alive   • Sis older Alive, age 24y   • Bro younger Alive, age 22y   • MGFa     • Cou  Alive       Patient Active Problem List    Diagnosis Date Noted   • Encounter for long-term (current) use of medications 2018   • Schizoaffective disorder, bipolar type (HCC) 2018   • Social anxiety disorder 2017          Objective:   There were no vitals taken for this visit.    Physical Exam:  Constitutional: Alert, no distress, well-groomed.  Skin: No rashes in visible areas.  Eye: Round. Conjunctiva clear, lids normal. No icterus.   ENMT: Lips pink without lesions, good dentition, moist mucous membranes. Phonation normal.  Neck: No masses, no thyromegaly. Moves freely without pain.  CV: Pulse as reported by patient  Respiratory: Unlabored respiratory effort, no cough or audible wheeze  Psych: Alert and oriented x3, normal affect and mood.       Assessment and Plan:   The following treatment plan was discussed:     1. Fatigue, unspecified type  - VITAMIN D,25 HYDROXY; Future  - TESTOSTERONE F&T MALE ADULT; Future    2. General medical exam  - CBC WITH DIFFERENTIAL; Future  - Lipid Profile; Future  - HEMOGLOBIN A1C; Future  - Comp Metabolic Panel; Future  - TSH WITH REFLEX TO FT4; Future    3. Screening for STD (sexually transmitted disease)  Screening, no active symptoms.   - Chlamydia/GC PCR Urine Or Swab; Future  - HIV AG/AB COMBO ASSAY SCREENING; Future  - T.PALLIDUM AB EIA; Future    4. Schizoaffective disorder, bipolar type (HCC)  New to me; chronic and well controlled w/o medication currently.   Pt requesting routine labs to make sure kidney and liver function is all normal since being off medication.   Continues to see psychologist once per week.   Doing well with mindfulness practices.   Continue to monitor, follow up as  needed.     Follow-up: Return if symptoms worsen or fail to improve.    Liz Peña P.A.-C.  Supervising MD: Dr. Jennifer Amos MD  11/12/20

## 2020-11-12 NOTE — Clinical Note
Pt would like to schedule flu shot.   Can you pend the order as future and I will sign off on it :)    Thank you!! Liz Peña P.A.-C.

## 2020-11-17 ENCOUNTER — HOSPITAL ENCOUNTER (OUTPATIENT)
Dept: LAB | Facility: MEDICAL CENTER | Age: 25
End: 2020-11-17
Attending: PHYSICIAN ASSISTANT
Payer: COMMERCIAL

## 2020-11-17 DIAGNOSIS — Z11.3 SCREENING FOR STD (SEXUALLY TRANSMITTED DISEASE): ICD-10-CM

## 2020-11-17 DIAGNOSIS — R53.83 FATIGUE, UNSPECIFIED TYPE: ICD-10-CM

## 2020-11-17 DIAGNOSIS — Z00.00 GENERAL MEDICAL EXAM: ICD-10-CM

## 2020-11-17 LAB
25(OH)D3 SERPL-MCNC: 51 NG/ML (ref 30–100)
ALBUMIN SERPL BCP-MCNC: 4.8 G/DL (ref 3.2–4.9)
ALBUMIN/GLOB SERPL: 2 G/DL
ALP SERPL-CCNC: 60 U/L (ref 30–99)
ALT SERPL-CCNC: 15 U/L (ref 2–50)
ANION GAP SERPL CALC-SCNC: 11 MMOL/L (ref 7–16)
AST SERPL-CCNC: 21 U/L (ref 12–45)
BASOPHILS # BLD AUTO: 0.5 % (ref 0–1.8)
BASOPHILS # BLD: 0.02 K/UL (ref 0–0.12)
BILIRUB SERPL-MCNC: 0.5 MG/DL (ref 0.1–1.5)
BUN SERPL-MCNC: 16 MG/DL (ref 8–22)
CALCIUM SERPL-MCNC: 9.6 MG/DL (ref 8.4–10.2)
CHLORIDE SERPL-SCNC: 103 MMOL/L (ref 96–112)
CHOLEST SERPL-MCNC: 154 MG/DL (ref 100–199)
CO2 SERPL-SCNC: 28 MMOL/L (ref 20–33)
CREAT SERPL-MCNC: 0.94 MG/DL (ref 0.5–1.4)
EOSINOPHIL # BLD AUTO: 0.12 K/UL (ref 0–0.51)
EOSINOPHIL NFR BLD: 2.8 % (ref 0–6.9)
ERYTHROCYTE [DISTWIDTH] IN BLOOD BY AUTOMATED COUNT: 44 FL (ref 35.9–50)
EST. AVERAGE GLUCOSE BLD GHB EST-MCNC: 94 MG/DL
FASTING STATUS PATIENT QL REPORTED: NORMAL
GLOBULIN SER CALC-MCNC: 2.4 G/DL (ref 1.9–3.5)
GLUCOSE SERPL-MCNC: 78 MG/DL (ref 65–99)
HBA1C MFR BLD: 4.9 % (ref 0–5.6)
HCT VFR BLD AUTO: 48 % (ref 42–52)
HDLC SERPL-MCNC: 50 MG/DL
HGB BLD-MCNC: 16.3 G/DL (ref 14–18)
HIV 1+2 AB+HIV1 P24 AG SERPL QL IA: NORMAL
IMM GRANULOCYTES # BLD AUTO: 0 K/UL (ref 0–0.11)
IMM GRANULOCYTES NFR BLD AUTO: 0 % (ref 0–0.9)
LDLC SERPL CALC-MCNC: 93 MG/DL
LYMPHOCYTES # BLD AUTO: 1.91 K/UL (ref 1–4.8)
LYMPHOCYTES NFR BLD: 45.2 % (ref 22–41)
MCH RBC QN AUTO: 32 PG (ref 27–33)
MCHC RBC AUTO-ENTMCNC: 34 G/DL (ref 33.7–35.3)
MCV RBC AUTO: 94.1 FL (ref 81.4–97.8)
MONOCYTES # BLD AUTO: 0.26 K/UL (ref 0–0.85)
MONOCYTES NFR BLD AUTO: 6.1 % (ref 0–13.4)
NEUTROPHILS # BLD AUTO: 1.92 K/UL (ref 1.82–7.42)
NEUTROPHILS NFR BLD: 45.4 % (ref 44–72)
NRBC # BLD AUTO: 0 K/UL
NRBC BLD-RTO: 0 /100 WBC
PLATELET # BLD AUTO: 175 K/UL (ref 164–446)
PMV BLD AUTO: 11.1 FL (ref 9–12.9)
POTASSIUM SERPL-SCNC: 4.6 MMOL/L (ref 3.6–5.5)
PROT SERPL-MCNC: 7.2 G/DL (ref 6–8.2)
RBC # BLD AUTO: 5.1 M/UL (ref 4.7–6.1)
SODIUM SERPL-SCNC: 142 MMOL/L (ref 135–145)
TREPONEMA PALLIDUM IGG+IGM AB [PRESENCE] IN SERUM OR PLASMA BY IMMUNOASSAY: NORMAL
TRIGL SERPL-MCNC: 56 MG/DL (ref 0–149)
TSH SERPL DL<=0.005 MIU/L-ACNC: 2.88 UIU/ML (ref 0.38–5.33)
WBC # BLD AUTO: 4.2 K/UL (ref 4.8–10.8)

## 2020-11-17 PROCEDURE — 80053 COMPREHEN METABOLIC PANEL: CPT

## 2020-11-17 PROCEDURE — 36415 COLL VENOUS BLD VENIPUNCTURE: CPT

## 2020-11-17 PROCEDURE — 84402 ASSAY OF FREE TESTOSTERONE: CPT

## 2020-11-17 PROCEDURE — 83036 HEMOGLOBIN GLYCOSYLATED A1C: CPT

## 2020-11-17 PROCEDURE — 86780 TREPONEMA PALLIDUM: CPT

## 2020-11-17 PROCEDURE — 82306 VITAMIN D 25 HYDROXY: CPT

## 2020-11-17 PROCEDURE — 84403 ASSAY OF TOTAL TESTOSTERONE: CPT

## 2020-11-17 PROCEDURE — 87389 HIV-1 AG W/HIV-1&-2 AB AG IA: CPT

## 2020-11-17 PROCEDURE — 85025 COMPLETE CBC W/AUTO DIFF WBC: CPT

## 2020-11-17 PROCEDURE — 87591 N.GONORRHOEAE DNA AMP PROB: CPT

## 2020-11-17 PROCEDURE — 80061 LIPID PANEL: CPT

## 2020-11-17 PROCEDURE — 84270 ASSAY OF SEX HORMONE GLOBUL: CPT

## 2020-11-17 PROCEDURE — 84443 ASSAY THYROID STIM HORMONE: CPT

## 2020-11-17 PROCEDURE — 87491 CHLMYD TRACH DNA AMP PROBE: CPT

## 2020-11-19 LAB
C TRACH DNA SPEC QL NAA+PROBE: NEGATIVE
N GONORRHOEA DNA SPEC QL NAA+PROBE: NEGATIVE
SHBG SERPL-SCNC: 44 NMOL/L (ref 11–80)
SPECIMEN SOURCE: NORMAL
TESTOST FREE MFR SERPL: 1.8 % (ref 1.6–2.9)
TESTOST FREE SERPL-MCNC: 148 PG/ML (ref 47–244)
TESTOST SERPL-MCNC: 842 NG/DL (ref 300–1080)

## 2022-06-04 NOTE — PROGRESS NOTES
[facility]  PSYCHIATRIC FOLLOW-UP NOTE    Name: Joshua Xiong  MRN: [id number]  : 1995  Age: 22 y.o.  Date of assessment: 10/03/17  PCP: Chuy Sellers M.D.  Persons in attendance: Patient  Total face-to-face time: 30 minutes    REASON FOR VISIT/CHIEF COMPLAINT (as stated by Patient):  Joshua Xinog is a 22 y.o., White male, attending follow-up appointment for Social Anxiety and mood instability. Last seen by this provider on 17.     CURRENT PSYCHOTROPIC MEDS:  -Valium 5mg PO BID  -Depakote 1000mg PO QHS     HISTORY OF PRESENT ILLNESS:    Seen and evaluated in outpatient clinic this morning. Says that Depakote has caused severe sedation/somnolence and patient has been struggling to take this medication as prescribed. Currently taking 1000mg PO QHS, says that his brain has been racing and depression has worsened. Says he did much better on seroquel and discussed higher dose of seroquel as primary treatment for bipolar depression. Agreed to slow titration of Seroquel.     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 Says that he is able to go to school and stay focused in class because of the Valium but admits that his depression has kept him isolated from family and friends. Denies wanting to hurt self or others at this time. Appetite has not changed, eating appropriatly.    PSYCHOSOCIAL CHANGES SINCE PREVIOUS CONTACT:  Recently applied for jobs at [employer] - says he has a good change of getting a part time position.    RESPONSE TO TREATMENT:  Depakote has worsened depression, increased somnolence     ADVERSE REACTION:   - hx of of Manic like episode X 2 with trial of SSRI (severe reaction )     MEDICATION SIDE EFFECTS:  As stated above    REVIEW OF SYSTEMS:        Constitutional negative   Eyes negative   Ears/Nose/Mouth/Throat negative   Cardiovascular negative   Respiratory negative   Gastrointestinal negative    Genitourinary negative   Muscular negative   Integumentary negative   Neurological negative   Endocrine negative   Hematologic/Lymphatic negative       PSYCHIATRIC EXAMINATION/MENTAL STATUS  There were no vitals taken for this visit.  Participation: Active verbal participation  Grooming:Good and Casual  Orientation: Alert  Eye contact: Good  Behavior:Calm   Mood: low  Affect: restricted/blunted  Thought process: Logical  Thought content:  Within normal limits  Speech: mildly slow, monotonous   Perception:  Within normal limits  Memory:  No gross evidence of memory deficits  Insight:fair  Judgment: fair       Current risk:    Suicide: low/moderate : hx of 1 previous suicidal attempt (impulsive) - appears to have a mixed bipolar episode secondary to adverse reaction from SSRI   Homicide: Low   Self-harm: moderate  Relapse: Low  Crisis Safety Plan reviewed?Yes  If evidence of imminent risk is present, intervention/plan: call crisis line or go straight to emergency room if suicidal/homicidal or significant changes to mental status.     Medical Records/Labs/Diagnostic Tests Reviewed: 5/19/2017 LABS:  Cholesterol 221 (H),  (H) otherwise CBC, CMP, and TSH within normal limits    Medical Records/Labs/Diagnostic Tests Ordered: none at this time.      ASSESSMENT:   21 y/o male with no previous previous psychiatric hx, f/u for mood instability and anxiety. Patient appears to suffer from social anxiety vs generalized anxiety disorder for as long as he can remember along with hx of depression and mood instability over the last 2 years with hx of one previous suicidal attempt likely due to adverse effect of Lexapro (appears to have resulted in Mixed bipolar state) . After further review and additional historical information, patient does meet criteria for bipolar disorder and would benefit from both therapy for anxiety along with mood stabiliizer. Discussed higher doses of Seroquel for primary treatment of bipolar  depression and both first line mood stabilizers Lithium and Depakote presented with side effects/adverse effects. Short trial of Valium for patients debilitating anxiety/panic disorder.     #Bipolar II disorder, with mixed features  -r/o Bipolar 1 disorder, with mixed features  #Social Anxiety Disorder    -r/o Generalized Anxiety Disorder    -Trial Seroquel for Bipolar Depression. Start with 100mg PO QHS, increase by 50mg weekly until at 200mg PO QHS, will reevaluated on next visit. Patient found depression/mood has improved with 100mg PO QHS previously.   -D/C Depakote- was depression has worsened.   -Cont. Valium 5mg PO BID for continued anxiety and panic attacks  -f/u 4 weeks      Previous Psychotropic Meds:  -lithium - excessive water drinking/dehydration , causing low sodium/ increased weakness   -depakote- worsened depression according to patient.     Senthil Grigsby M.D.                Yes